# Patient Record
Sex: FEMALE | Race: WHITE | Employment: OTHER | URBAN - METROPOLITAN AREA
[De-identification: names, ages, dates, MRNs, and addresses within clinical notes are randomized per-mention and may not be internally consistent; named-entity substitution may affect disease eponyms.]

---

## 2018-08-06 ENCOUNTER — APPOINTMENT (OUTPATIENT)
Dept: ULTRASOUND IMAGING | Facility: CLINIC | Age: 73
End: 2018-08-06
Attending: PHYSICIAN ASSISTANT
Payer: COMMERCIAL

## 2018-08-06 ENCOUNTER — HOSPITAL ENCOUNTER (EMERGENCY)
Facility: CLINIC | Age: 73
Discharge: HOME | End: 2018-08-06
Attending: PHYSICIAN ASSISTANT | Admitting: PHYSICIAN ASSISTANT
Payer: COMMERCIAL

## 2018-08-06 VITALS
DIASTOLIC BLOOD PRESSURE: 82 MMHG | SYSTOLIC BLOOD PRESSURE: 150 MMHG | TEMPERATURE: 98.1 F | OXYGEN SATURATION: 98 % | RESPIRATION RATE: 16 BRPM

## 2018-08-06 DIAGNOSIS — M79.661 PAIN OF RIGHT LOWER LEG: ICD-10-CM

## 2018-08-06 PROCEDURE — G0463 HOSPITAL OUTPT CLINIC VISIT: HCPCS | Mod: 25 | Performed by: PHYSICIAN ASSISTANT

## 2018-08-06 PROCEDURE — 93971 EXTREMITY STUDY: CPT | Mod: RT

## 2018-08-06 PROCEDURE — 99213 OFFICE O/P EST LOW 20 MIN: CPT | Mod: Z6 | Performed by: PHYSICIAN ASSISTANT

## 2018-08-06 RX ORDER — NAPROXEN 375 MG/1
TABLET, DELAYED RELEASE ORAL EVERY 12 HOURS PRN
COMMUNITY
End: 2018-11-28

## 2018-08-06 RX ORDER — BUTALBITAL/ASPIRIN/CAFFEINE 50-325-40
1 CAPSULE ORAL EVERY 8 HOURS PRN
COMMUNITY
End: 2018-08-13

## 2018-08-06 RX ORDER — ONDANSETRON 4 MG/1
4 TABLET, ORALLY DISINTEGRATING ORAL EVERY 8 HOURS PRN
COMMUNITY

## 2018-08-06 RX ORDER — PROPRANOLOL HYDROCHLORIDE 40 MG/1
40 TABLET ORAL 2 TIMES DAILY
COMMUNITY
End: 2018-08-13

## 2018-08-06 RX ORDER — MEMANTINE HYDROCHLORIDE 28 MG/1
28 CAPSULE, EXTENDED RELEASE ORAL DAILY
COMMUNITY
End: 2018-08-13

## 2018-08-06 ASSESSMENT — ENCOUNTER SYMPTOMS
WHEEZING: 0
COUGH: 0
NUMBNESS: 0
JOINT SWELLING: 0
PALPITATIONS: 0
WOUND: 0
FEVER: 0
BACK PAIN: 0
WEAKNESS: 0
SHORTNESS OF BREATH: 0

## 2018-08-06 NOTE — ED AVS SNAPSHOT
[facility] Emergency Department    [address] [address]    Phone:  [phone]    Fax:  [phone]                                       Demetria France   MRN: [id number]    Department:  [facility] Emergency Department   Date of Visit:  8/6/2018           Patient Information     Date Of Birth          1945        Your diagnoses for this visit were:     Pain of right lower leg        You were seen by Brandy Candelario PA-C.      Follow-up Information     Follow up with No Ref-Primary, Physician In 1 week.        Discharge Instructions       Tylenol, heat, rest.    Patient to follow up with primary care provider for recheck in 5-7 days.     Negative ultrasound for DVT (blood clot)     Discussed with patient that she may need Physical Therapy to help with symptoms.     Patient to return to Emergency Room if fever, back pain, loss of bowel or bladder function, numbness/weakness, rash, swelling, or worsening symptoms occur    24 Hour Appointment Hotline       To make an appointment at any [facility], call [phone] ([phone]). If you don't have a family doctor or clinic, we will help you find one. [facility] clinics are conveniently located to serve the needs of you and your family.             Review of your medicines      Our records show that you are taking the medicines listed below. If these are incorrect, please call your family doctor or clinic.        Dose / Directions Last dose taken    butalbital-aspirin-caffeine -40 MG per capsule   Commonly known as:  FIORINAL   Dose:  1 capsule        Take 1 capsule by mouth every 8 hours as needed for headaches   Refills:  0        CLONAZEPAM PO   Dose:  1 mg        Take 1 mg by mouth At Bedtime   Refills:  0        LEVOTHROID PO   Dose:  100 mcg        Take 100 mcg by mouth daily   Refills:  0        memantine XR 28 MG 24 hr capsule   Commonly known as:  NAMENDA XR   Dose:  28 mg        Take 28 mg by mouth  daily   Refills:  0        METHOCARBAMOL PO   Dose:  500 mg        Take 500 mg by mouth 1-2 tabs three times daily as needed   Refills:  0        naproxen 375 MG Tbec   Generic drug:  naproxen        Take by mouth every 12 hours as needed   Refills:  0        ondansetron 4 MG ODT tab   Commonly known as:  ZOFRAN-ODT   Dose:  4 mg        Take 4 mg by mouth every 8 hours as needed for nausea   Refills:  0        PAROXETINE HCL PO   Dose:  10 mg        Take 10 mg by mouth daily   Refills:  0        propranolol 40 MG tablet   Commonly known as:  INDERAL   Dose:  40 mg        Take 40 mg by mouth 2 times daily   Refills:  0                Procedures and tests performed during your visit     US Lower Extremity Venous Duplex Right      Orders Needing Specimen Collection     None      Pending Results     Date and Time Order Name Status Description    8/6/2018 1933 US Lower Extremity Venous Duplex Right Preliminary             Pending Culture Results     No orders found from 8/4/2018 to 8/7/2018.            Pending Results Instructions     If you had any lab results that were not finalized at the time of your Discharge, you can call the ED Lab Result RN at [phone]. You will be contacted by this team for any positive Lab results or changes in treatment. The nurses are available 7 days a week from 10A to 6:30P.  You can leave a message 24 hours per day and they will return your call.        Test Results From Your Hospital Stay        8/6/2018  8:42 PM      Narrative     VENOUS DOPPLER RIGHT LOWER EXTREMITY 8/6/2018 8:15 PM    HISTORY: Right lower extremity pain.    COMPARISON: None.    FINDINGS: Color-flow imaging and Doppler waveform spectral analysis  were utilized. There is normal compressibility and spontaneous flow  throughout the right common femoral, superficial femoral, popliteal  and visualized calf veins.        Impression     IMPRESSION: No evidence for deep venous thrombosis.                 Thank you for  "choosing [facility]       Thank you for choosing [facility] for your care. Our goal is always to provide you with excellent care. Hearing back from our patients is one way we can continue to improve our services. Please take a few minutes to complete the written survey that you may receive in the mail after you visit with us. Thank you!        [employer] Information     [employer] lets you send messages to your doctor, view your test results, renew your prescriptions, schedule appointments and more. To sign up, go to [email or web address] . Click on \"Log in\" on the left side of the screen, which will take you to the Welcome page. Then click on \"Sign up Now\" on the right side of the page.     You will be asked to enter the access code listed below, as well as some personal information. Please follow the directions to create your username and password.     Your access code is: [email or web address]  Expires: 2018  8:49 PM     Your access code will  in 90 days. If you need help or a new code, please call your [facility] clinic or [phone].        Care EveryWhere ID     This is your Care EveryWhere ID. This could be used by other organizations to access your [facility] medical records  [id number]        Equal Access to Services     RICKEY JACKSON : Hadbernadette dislao Sojustin, waaxda luqadaha, qaybta kaalmada adeegyada, abigail gonzalez. So [facility] [phone].    ATENCIÓN: Si habla español, tiene a nolen disposición servicios gratuitos de asistencia lingüística. Llame al [phone].    We comply with applicable federal civil rights laws and [place] laws. We do not discriminate on the basis of race, color, national origin, age, disability, sex, sexual orientation, or gender identity.            After Visit Summary       This is your record. Keep this with you and show to your community pharmacist(s) and doctor(s) at your next visit.                  "

## 2018-08-06 NOTE — ED AVS SNAPSHOT
[facility] Emergency Department    [address] [address]    Phone:  [phone]    Fax:  [phone]                                       Demetria France   MRN: [id number]    Department:  [facility] Emergency Department   Date of Visit:  8/6/2018           After Visit Summary Signature Page     I have received my discharge instructions, and my questions have been answered. I have discussed any challenges I see with this plan with the nurse or doctor.    ..........................................................................................................................................  Patient/Patient Representative Signature      ..........................................................................................................................................  Patient Representative Print Name and Relationship to Patient    ..................................................               ................................................  Date                                            Time    ..........................................................................................................................................  Reviewed by Signature/Title    ...................................................              ..............................................  Date                                                            Time

## 2018-08-07 NOTE — ED PROVIDER NOTES
History     Chief Complaint   Patient presents with     Leg Pain     bilat lower leg pain- fall approx 1 month ago.  Continues to have pain.  States that her veins hurt     HPI  Demetria France is a 73 year old female who presents to the urgent care today with daughter who is patient's power of  for right lower leg pain.  Patient states that about 3 weeks ago she injured it, but due to dementia daughter states that mother does not know exactly how she injured leg.  Daughter also states that injury was not witnessed.  Since then patient has been complaining on and off of right lower leg pain.  Patient does have a history of varicose veins and wears BERTRAM hose daily for this.  Patient denies any significant swelling, bruising, redness, numbness/tingling, or weakness in the lower extremity.  Patient originally stated that pain was on the lateral aspect of the right calf, but after talking she then points to the posterior thigh.  Patient appears very forgetful and does not remember exactly what she was discussing throughout obtaining her history.  Daughter states that patient will call her at work crying complaining of leg pain.  Patient denies this.  Patient states that she is very concerned about a blood clot and would like this to be ruled out today, but then later on states that this is probably not a blood clot and it is just her varicose veins flaring up.  Daughter states that she did contact [place] and they informed them to come down here for further evaluation.  Patient at time of initial injury was evaluated and had an x-ray that was negative.  Daughter states she was given a prescription for anti-inflammatories and took them very irregularly.  Patient has not been taking anything for the past couple weeks.  Patient denies any history of DVTs or clots.  Patient states she does have a history of occasional lower back pain, but denies this currently.  Patient has also had sciatic flareups in the  past.    Problem List:    There are no active problems to display for this patient.       Past Medical History:    History reviewed. No pertinent past medical history.    Past Surgical History:    History reviewed. No pertinent surgical history.    Family History:    No family history on file.    Social History:  Marital Status:   [5]  Social History   Substance Use Topics     Smoking status: Never Smoker     Smokeless tobacco: Never Used     Alcohol use Not on file        Medications:      butalbital-aspirin-caffeine (FIORINAL) -40 MG per capsule   CLONAZEPAM PO   Levothyroxine Sodium (LEVOTHROID PO)   memantine XR (NAMENDA XR) 28 MG 24 hr capsule   METHOCARBAMOL PO   naproxen (NAPROXEN) 375 MG TBEC   ondansetron (ZOFRAN-ODT) 4 MG ODT tab   PAROXETINE HCL PO   propranolol (INDERAL) 40 MG tablet         Review of Systems   Constitutional: Negative for fever.   Respiratory: Negative for cough, shortness of breath and wheezing.    Cardiovascular: Negative for chest pain and palpitations.   Musculoskeletal: Negative for back pain, gait problem and joint swelling.        Right lower leg pain. Patient denies, ankle/knee/hip/groin pain.    Skin: Negative for rash and wound.   Neurological: Negative for weakness and numbness.   All other systems reviewed and are negative.      Physical Exam   BP: 150/82  Heart Rate: 50  Temp: 98.1  F (36.7  C)  Resp: 16  SpO2: 98 %      Physical Exam   Constitutional: She is oriented to person, place, and time. She appears well-developed and well-nourished. No distress.   Cardiovascular: Normal rate, regular rhythm and normal heart sounds.    Pulmonary/Chest: Effort normal and breath sounds normal.   Musculoskeletal: Normal range of motion. She exhibits no edema, tenderness or deformity.        Right hip: Normal.        Right knee: Normal.        Right ankle: Normal. Achilles tendon normal.        Right lower leg: She exhibits no tenderness, no bony tenderness, no swelling, no  edema, no deformity and no laceration.        Legs:  Negative straight leg raise bilaterally with no pain on dorsiflexion of great toes bilaterally.    Neurological: She is alert and oriented to person, place, and time. She has normal strength and normal reflexes. No cranial nerve deficit or sensory deficit. Gait normal. GCS eye subscore is 4. GCS verbal subscore is 5. GCS motor subscore is 6.   Reflex Scores:       Patellar reflexes are 2+ on the right side and 2+ on the left side.  Patient appears pleasantly confused and forgetful throughout exam.    Skin: Skin is warm, dry and intact. No abrasion, no bruising, no ecchymosis and no rash noted. She is not diaphoretic. No erythema.   Psychiatric: She has a normal mood and affect. Her speech is normal and behavior is normal. Judgment and thought content normal.   Pleasantly confused.        ED Course     ED Course     Procedures              Critical Care time:  none               Results for orders placed or performed during the hospital encounter of 08/06/18 (from the past 24 hour(s))   US Lower Extremity Venous Duplex Right    Narrative    VENOUS DOPPLER RIGHT LOWER EXTREMITY 8/6/2018 8:15 PM    HISTORY: Right lower extremity pain.    COMPARISON: None.    FINDINGS: Color-flow imaging and Doppler waveform spectral analysis  were utilized. There is normal compressibility and spontaneous flow  throughout the right common femoral, superficial femoral, popliteal  and visualized calf veins.      Impression    IMPRESSION: No evidence for deep venous thrombosis.        Medications - No data to display    Assessments & Plan (with Medical Decision Making)     I have reviewed the nursing notes.    I have reviewed the findings, diagnosis, plan and need for follow up with the patient.   discussed results with patient's daughter and patient. Negative venous doppler.     DDX: sciatic nerve pain, varicose veins, chronic leg pain.     Discussed with patient and daughter that  symptoms appear to be more sciatic in origin.     Patient to follow up with primary care provider for recheck in 1 week and may need Physical Therapy.   Heat, rest, tylenol over the counter as needed for pain.     Discharge Medication List as of 8/6/2018  8:49 PM          Final diagnoses:   Pain of right lower leg       8/6/2018   [facility] EMERGENCY DEPARTMENT     Brandy Candelario PA-C  08/06/18 [id number]

## 2018-08-07 NOTE — DISCHARGE INSTRUCTIONS
Tylenol, heat, rest.    Patient to follow up with primary care provider for recheck in 5-7 days.     Negative ultrasound for DVT (blood clot)     Discussed with patient that she may need Physical Therapy to help with symptoms.     Patient to return to Emergency Room if fever, back pain, loss of bowel or bladder function, numbness/weakness, rash, swelling, or worsening symptoms occur

## 2018-08-07 NOTE — ED TRIAGE NOTES
Patient here for leg pain - mostly the R side, symptoms started about a month ago.   Patient presents ambulaotry to the urgent care.

## 2018-08-13 ENCOUNTER — OFFICE VISIT (OUTPATIENT)
Dept: FAMILY MEDICINE | Facility: CLINIC | Age: 73
End: 2018-08-13
Payer: COMMERCIAL

## 2018-08-13 VITALS
SYSTOLIC BLOOD PRESSURE: 141 MMHG | WEIGHT: 169.6 LBS | HEART RATE: 59 BPM | DIASTOLIC BLOOD PRESSURE: 79 MMHG | TEMPERATURE: 98.3 F

## 2018-08-13 DIAGNOSIS — G47.00 PERSISTENT INSOMNIA: Primary | ICD-10-CM

## 2018-08-13 DIAGNOSIS — E03.9 ACQUIRED HYPOTHYROIDISM: ICD-10-CM

## 2018-08-13 DIAGNOSIS — M62.838 MUSCLE SPASM: ICD-10-CM

## 2018-08-13 DIAGNOSIS — G44.219 EPISODIC TENSION-TYPE HEADACHE, NOT INTRACTABLE: ICD-10-CM

## 2018-08-13 PROCEDURE — 99203 OFFICE O/P NEW LOW 30 MIN: CPT | Performed by: FAMILY MEDICINE

## 2018-08-13 RX ORDER — CLONAZEPAM 1 MG/1
0.5-1 TABLET ORAL 2 TIMES DAILY PRN
Qty: 30 TABLET | Refills: 3 | Status: SHIPPED | OUTPATIENT
Start: 2018-08-13 | End: 2018-10-02

## 2018-08-13 RX ORDER — METHOCARBAMOL 500 MG/1
TABLET, FILM COATED ORAL
Qty: 60 TABLET | Refills: 3 | Status: SHIPPED | OUTPATIENT
Start: 2018-08-13 | End: 2018-10-02

## 2018-08-13 RX ORDER — MEMANTINE HYDROCHLORIDE 28 MG/1
28 CAPSULE, EXTENDED RELEASE ORAL DAILY
Qty: 30 CAPSULE | Refills: 3 | Status: SHIPPED | OUTPATIENT
Start: 2018-08-13 | End: 2018-10-02

## 2018-08-13 RX ORDER — BUTALBITAL/ASPIRIN/CAFFEINE 50-325-40
1 CAPSULE ORAL EVERY 8 HOURS PRN
Qty: 42 CAPSULE | Refills: 3 | Status: SHIPPED | OUTPATIENT
Start: 2018-08-13 | End: 2018-10-02

## 2018-08-13 RX ORDER — LEVOTHYROXINE SODIUM 100 UG/1
100 TABLET ORAL DAILY
Qty: 90 TABLET | Refills: 3 | Status: SHIPPED | OUTPATIENT
Start: 2018-08-13

## 2018-08-13 RX ORDER — PROPRANOLOL HYDROCHLORIDE 40 MG/1
40 TABLET ORAL 2 TIMES DAILY
Qty: 60 TABLET | Refills: 3 | Status: SHIPPED | OUTPATIENT
Start: 2018-08-13 | End: 2018-10-02

## 2018-08-13 NOTE — NURSING NOTE
Chief Complaint   Patient presents with     ER F/U     Establish Care       Initial /79 (BP Location: Right arm, Patient Position: Sitting, Cuff Size: Adult Regular)  Pulse 59  Temp 98.3  F (36.8  C) (Tympanic)  Wt 169 lb 9.6 oz (76.9 kg) There is no height or weight on file to calculate BMI.      Health Maintenance that is potentially due pending provider review:  Mammogram and Colonoscopy/FIT    Patient states that she had a mammogram and colonoscopy about a year ago.Will ask for records     Is there anyone who you would like to be able to receive your results? No  If yes have patient fill out JUDIT BATES CMA

## 2018-08-13 NOTE — PROGRESS NOTES
SUBJECTIVE:   Demetria France is a 73 year old female who presents to clinic today for the following health issues:    Establish Care   This lady is new to the area and is here to talk over medications and to discuss her chronic leg pain.  This lady has moved here from [place] because she was having some difficulty managing her home because of some issues with memory.  She has been seen once in this area in the emergency room with some leg pain and at that point in time a Doppler ultrasound was normal.  She has had previous varicose vein surgery and today her current surgeon is primarily that she has some pain in her calves bilaterally and she does have some pain down the back of her thigh.  She thinks she may well have sciatica.  I have gone over all her current medications and we refilled them however I have cautioned her at length today about the fact that there are several medications here it could be playing a part in some of her mental performance issues.  She continues to live with her daughter here at [place], drive her old vehicle and admits to ongoing issues with memory loss.  ED/UC Followup:    Facility[facility]  Date of visit: 8/6/2018  Reason for visit: Pain of right lower leg  Current Status: not improved at all              Problem list and histories reviewed & adjusted, as indicated.  Additional history: as documented    There is no problem list on file for this patient.    History reviewed. No pertinent surgical history.    Social History   Substance Use Topics     Smoking status: Never Smoker     Smokeless tobacco: Never Used     Alcohol use Not on file     History reviewed. No pertinent family history.      Current Outpatient Prescriptions   Medication Sig Dispense Refill     butalbital-aspirin-caffeine (FIORINAL) -40 MG per capsule Take 1 capsule by mouth every 8 hours as needed for headaches 42 capsule 3     clonazePAM (KLONOPIN) 1 MG tablet Take 0.5-1 tablets (0.5-1 mg) by mouth 2  times daily as needed for anxiety 30 tablet 3     CLONAZEPAM PO Take 1 mg by mouth At Bedtime       levothyroxine (SYNTHROID/LEVOTHROID) 100 MCG tablet Take 1 tablet (100 mcg) by mouth daily 90 tablet 3     Levothyroxine Sodium (LEVOTHROID PO) Take 100 mcg by mouth daily       memantine XR (NAMENDA XR) 28 MG 24 hr capsule Take 1 capsule (28 mg) by mouth daily 30 capsule 3     methocarbamol (ROBAXIN) 500 MG tablet Take one at night 60 tablet 3     naproxen (NAPROSYN) 375 MG tablet Take 1 tablet (375 mg) by mouth 2 times daily (with meals) 60 tablet 3     naproxen (NAPROXEN) 375 MG TBEC Take by mouth every 12 hours as needed       PAROXETINE HCL PO Take 10 mg by mouth daily       propranolol (INDERAL) 40 MG tablet Take 1 tablet (40 mg) by mouth 2 times daily 60 tablet 3     ondansetron (ZOFRAN-ODT) 4 MG ODT tab Take 4 mg by mouth every 8 hours as needed for nausea       Allergies   Allergen Reactions     Penicillins      BP Readings from Last 3 Encounters:   08/13/18 141/79   08/06/18 150/82    Wt Readings from Last 3 Encounters:   08/13/18 169 lb 9.6 oz (76.9 kg)                    Reviewed and updated as needed this visit by clinical staff       Reviewed and updated as needed this visit by Provider         ROS:  CONSTITUTIONAL: NEGATIVE for fever, chills, change in weight  ENT/MOUTH: NEGATIVE for ear, mouth and throat problems  RESP: NEGATIVE for significant cough or SOB  CV: NEGATIVE for chest pain, palpitations or peripheral edema    OBJECTIVE:     /79 (BP Location: Right arm, Patient Position: Sitting, Cuff Size: Adult Regular)  Pulse 59  Temp 98.3  F (36.8  C) (Tympanic)  Wt 169 lb 9.6 oz (76.9 kg)  There is no height or weight on file to calculate BMI.  GENERAL: healthy, alert and no distress her physical examination today is fairly normal however she is having some definite difficulty with remembering exactly what medicine she is on and what medication it is been prescribed for.  NECK: no adenopathy,  no asymmetry, masses, or scars and thyroid normal to palpation  RESP: lungs clear to auscultation - no rales, rhonchi or wheezes  CV: regular rate and rhythm, normal S1 S2, no S3 or S4, no murmur, click or rub, no peripheral edema and peripheral pulses strong  ABDOMEN: soft, nontender, no hepatosplenomegaly, no masses and bowel sounds normal  MS: no gross musculoskeletal defects noted, no edema        ASSESSMENT/PLAN:             1. Persistent insomnia  We have cautioned her today at length about the clonazepam on a long-term basis in the person with significant memory loss.  - clonazePAM (KLONOPIN) 1 MG tablet; Take 0.5-1 tablets (0.5-1 mg) by mouth 2 times daily as needed for anxiety  Dispense: 30 tablet; Refill: 3    2. Acquired hypothyroidism  We will go ahead and arrange for her to have laboratory data is the next visit however she has had fairly recent studies done.  - levothyroxine (SYNTHROID/LEVOTHROID) 100 MCG tablet; Take 1 tablet (100 mcg) by mouth daily  Dispense: 90 tablet; Refill: 3    3. Episodic tension-type headache, not intractable  She takes Inderal for a history of migraine but it does not sound like she is having on any ongoing migraine.  - propranolol (INDERAL) 40 MG tablet; Take 1 tablet (40 mg) by mouth 2 times daily  Dispense: 60 tablet; Refill: 3  - naproxen (NAPROSYN) 375 MG tablet; Take 1 tablet (375 mg) by mouth 2 times daily (with meals)  Dispense: 60 tablet; Refill: 3    4. Muscle spasm  She uses the Fiorinal for headaches the NAMENDA for memory loss and the Robaxin for muscle cramps.  - butalbital-aspirin-caffeine (FIORINAL) -40 MG per capsule; Take 1 capsule by mouth every 8 hours as needed for headaches  Dispense: 42 capsule; Refill: 3  - memantine XR (NAMENDA XR) 28 MG 24 hr capsule; Take 1 capsule (28 mg) by mouth daily  Dispense: 30 capsule; Refill: 3  - methocarbamol (ROBAXIN) 500 MG tablet; Take one at night  Dispense: 60 tablet; Refill: 3    ASSESSMENT/PLAN: We talked  over her diagnosis at length.  I told her I am quite concerned about the array of medications that she is on since they do not seem to be doing a lot of good and they may well be doing more harm.  This point she is reluctant to stop that but I think with time will be able to convince her to go away from these meds.      ICD-10-CM    1. Persistent insomnia G47.00 clonazePAM (KLONOPIN) 1 MG tablet   2. Acquired hypothyroidism E03.9 levothyroxine (SYNTHROID/LEVOTHROID) 100 MCG tablet   3. Episodic tension-type headache, not intractable G44.219 propranolol (INDERAL) 40 MG tablet     naproxen (NAPROSYN) 375 MG tablet   4. Muscle spasm M62.838 butalbital-aspirin-caffeine (FIORINAL) -40 MG per capsule     memantine XR (NAMENDA XR) 28 MG 24 hr capsule     methocarbamol (ROBAXIN) 500 MG tablet       Patient Instructions   1.  You do have varicose veins and one small superficial clot.  No worry.    2. Same meds for now    3. Recheck two months.     4. Call if pain gets worse.          Nolan Peraza MD  [facility]

## 2018-08-13 NOTE — MR AVS SNAPSHOT
"              After Visit Summary   8/13/2018    Demetria France    MRN: [id number]           Patient Information     Date Of Birth          1945        Visit Information        Provider Department      8/13/2018 3:00 PM Nolan Peraza MD [facility]        Today's Diagnoses     Persistent insomnia    -  1    Acquired hypothyroidism        Episodic tension-type headache, not intractable        Muscle spasm          Care Instructions    1.  You do have varicose veins and one small superficial clot.  No worry.    2. Same meds for now    3. Recheck two months.     4. Call if pain gets worse.          Follow-ups after your visit        Who to contact     If you have questions or need follow up information about today's clinic visit or your schedule please contact [facility] directly at [phone].  Normal or non-critical lab and imaging results will be communicated to you by MyChart, letter or phone within 4 business days after the clinic has received the results. If you do not hear from us within 7 days, please contact the clinic through MyChart or phone. If you have a critical or abnormal lab result, we will notify you by phone as soon as possible.  Submit refill requests through [employer] or call your pharmacy and they will forward the refill request to us. Please allow 3 business days for your refill to be completed.          Additional Information About Your Visit        MyChart Information     [employer] lets you send messages to your doctor, view your test results, renew your prescriptions, schedule appointments and more. To sign up, go to [email or web address] . Click on \"Log in\" on the left side of the screen, which will take you to the Welcome page. Then click on \"Sign up Now\" on the right side of the page.     You will be asked to enter the access code listed below, as well as some personal information. Please follow the directions to create your " username and password.     Your access code is: [email or web address]  Expires: 2018  8:49 PM     Your access code will  in 90 days. If you need help or a new code, please call your [facility] clinic or [phone].        Care EveryWhere ID     This is your Care EveryWhere ID. This could be used by other organizations to access your [facility] medical records  [id number]        Your Vitals Were     Pulse Temperature                59 98.3  F (36.8  C) (Tympanic)           Blood Pressure from Last 3 Encounters:   18 141/79   18 150/82    Weight from Last 3 Encounters:   18 169 lb 9.6 oz (76.9 kg)              Today, you had the following     No orders found for display         Today's Medication Changes          These changes are accurate as of 18  3:55 PM.  If you have any questions, ask your nurse or doctor.               These medicines have changed or have updated prescriptions.        Dose/Directions    * CLONAZEPAM PO   This may have changed:  Another medication with the same name was added. Make sure you understand how and when to take each.   Changed by:  Nolan Peraza MD        Dose:  1 mg   Take 1 mg by mouth At Bedtime   Refills:  0       * clonazePAM 1 MG tablet   Commonly known as:  klonoPIN   This may have changed:  You were already taking a medication with the same name, and this prescription was added. Make sure you understand how and when to take each.   Used for:  Persistent insomnia   Changed by:  Nolan Peraza MD        Dose:  0.5-1 mg   Take 0.5-1 tablets (0.5-1 mg) by mouth 2 times daily as needed for anxiety   Quantity:  30 tablet   Refills:  3       * LEVOTHROID PO   This may have changed:  Another medication with the same name was added. Make sure you understand how and when to take each.   Changed by:  Nolan Peraza MD        Dose:  100 mcg   Take 100 mcg by mouth daily   Refills:  0       * levothyroxine 100 MCG tablet    Commonly known as:  SYNTHROID/LEVOTHROID   This may have changed:  You were already taking a medication with the same name, and this prescription was added. Make sure you understand how and when to take each.   Used for:  Acquired hypothyroidism   Changed by:  Nolan Peraza MD        Dose:  100 mcg   Take 1 tablet (100 mcg) by mouth daily   Quantity:  90 tablet   Refills:  3       methocarbamol 500 MG tablet   Commonly known as:  ROBAXIN   This may have changed:    - how much to take  - how to take this  - additional instructions   Used for:  Muscle spasm   Changed by:  Nolan Peraza MD        Take one at night   Quantity:  60 tablet   Refills:  3       * naproxen 375 MG Tbec   This may have changed:  Another medication with the same name was added. Make sure you understand how and when to take each.   Generic drug:  naproxen   Changed by:  Nolan Peraza MD        Take by mouth every 12 hours as needed   Refills:  0       * naproxen 375 MG tablet   Commonly known as:  NAPROSYN   This may have changed:  You were already taking a medication with the same name, and this prescription was added. Make sure you understand how and when to take each.   Used for:  Episodic tension-type headache, not intractable   Changed by:  Nolan Peraza MD        Dose:  375 mg   Take 1 tablet (375 mg) by mouth 2 times daily (with meals)   Quantity:  60 tablet   Refills:  3       * Notice:  This list has 6 medication(s) that are the same as other medications prescribed for you. Read the directions carefully, and ask your doctor or other care provider to review them with you.         Where to get your medicines      These medications were sent to [address] [address]     Phone:  [phone]     levothyroxine 100 MCG tablet    memantine XR 28 MG 24 hr capsule    methocarbamol 500 MG tablet     naproxen 375 MG tablet    propranolol 40 MG tablet         Some of these will need a paper prescription and others can be bought over the counter.  Ask your nurse if you have questions.     Bring a paper prescription for each of these medications     butalbital-aspirin-caffeine -40 MG per capsule    clonazePAM 1 MG tablet                Primary Care Provider Fax #    Physician No Ref-Primary [phone]       No address on file        Equal Access to Services     RICKEY JACKSON : Hadii david ku hadasho Soomaali, waaxda luqadaha, qaybta kaalmada adeegyada, waxay geronimoin hayazuln adecosta lopez konstantin . So [facility] [phone].    ATENCIÓN: Si habla nadeem, tiene a nolen disposición servicios gratuitos de asistencia lingüística. Katie al [phone].    We comply with applicable federal civil rights laws and [place] laws. We do not discriminate on the basis of race, color, national origin, age, disability, sex, sexual orientation, or gender identity.            Thank you!     Thank you for choosing [facility]  for your care. Our goal is always to provide you with excellent care. Hearing back from our patients is one way we can continue to improve our services. Please take a few minutes to complete the written survey that you may receive in the mail after your visit with us. Thank you!             Your Updated Medication List - Protect others around you: Learn how to safely use, store and throw away your medicines at www.disposemymeds.org.          This list is accurate as of 8/13/18  3:55 PM.  Always use your most recent med list.                   Brand Name Dispense Instructions for use Diagnosis    butalbital-aspirin-caffeine -40 MG per capsule    FIORINAL    42 capsule    Take 1 capsule by mouth every 8 hours as needed for headaches    Muscle spasm       * CLONAZEPAM PO      Take 1 mg by mouth At Bedtime        * clonazePAM 1 MG tablet    klonoPIN    30 tablet    Take 0.5-1 tablets (0.5-1  mg) by mouth 2 times daily as needed for anxiety    Persistent insomnia       * LEVOTHROID PO      Take 100 mcg by mouth daily        * levothyroxine 100 MCG tablet    SYNTHROID/LEVOTHROID    90 tablet    Take 1 tablet (100 mcg) by mouth daily    Acquired hypothyroidism       memantine XR 28 MG 24 hr capsule    NAMENDA XR    30 capsule    Take 1 capsule (28 mg) by mouth daily    Muscle spasm       methocarbamol 500 MG tablet    ROBAXIN    60 tablet    Take one at night    Muscle spasm       * naproxen 375 MG Tbec   Generic drug:  naproxen      Take by mouth every 12 hours as needed        * naproxen 375 MG tablet    NAPROSYN    60 tablet    Take 1 tablet (375 mg) by mouth 2 times daily (with meals)    Episodic tension-type headache, not intractable       ondansetron 4 MG ODT tab    ZOFRAN-ODT     Take 4 mg by mouth every 8 hours as needed for nausea        PAROXETINE HCL PO      Take 10 mg by mouth daily        propranolol 40 MG tablet    INDERAL    60 tablet    Take 1 tablet (40 mg) by mouth 2 times daily    Episodic tension-type headache, not intractable       * Notice:  This list has 6 medication(s) that are the same as other medications prescribed for you. Read the directions carefully, and ask your doctor or other care provider to review them with you.

## 2018-08-13 NOTE — PATIENT INSTRUCTIONS
1.  You do have varicose veins and one small superficial clot.  No worry.    2. Same meds for now    3. Recheck two months.     4. Call if pain gets worse.

## 2018-08-14 ENCOUNTER — TELEPHONE (OUTPATIENT)
Dept: FAMILY MEDICINE | Facility: CLINIC | Age: 73
End: 2018-08-14

## 2018-08-14 DIAGNOSIS — M79.661 PAIN OF RIGHT LOWER LEG: Primary | ICD-10-CM

## 2018-08-14 NOTE — TELEPHONE ENCOUNTER
Voice message was left.8/13/18 at 6:03 PM      Daughter,Aurea says her mom saw Dr Peraza Monday and she was suppose to ask for a referral for physical therapy due to her sciatica. She says the  provider told her she needed this.  Please let Aurea know if this can be done.  Her phone is [phone]

## 2018-08-14 NOTE — TELEPHONE ENCOUNTER
Verbal order per Dr Peraza to order physical therapy.  Order placed.  Daughter Shannon notified  Please sign the order for PT  Angélica Castillo RN

## 2018-08-15 ENCOUNTER — HOSPITAL ENCOUNTER (OUTPATIENT)
Dept: PHYSICAL THERAPY | Facility: CLINIC | Age: 73
Setting detail: THERAPIES SERIES
End: 2018-08-15
Attending: FAMILY MEDICINE
Payer: COMMERCIAL

## 2018-08-15 PROCEDURE — G8979 MOBILITY GOAL STATUS: HCPCS | Mod: GP,CI

## 2018-08-15 PROCEDURE — 97140 MANUAL THERAPY 1/> REGIONS: CPT | Mod: GP

## 2018-08-15 PROCEDURE — G8978 MOBILITY CURRENT STATUS: HCPCS | Mod: GP,CK

## 2018-08-15 PROCEDURE — 97535 SELF CARE MNGMENT TRAINING: CPT | Mod: GP

## 2018-08-15 PROCEDURE — 40000718 ZZHC STATISTIC PT DEPARTMENT ORTHO VISIT

## 2018-08-15 PROCEDURE — 97110 THERAPEUTIC EXERCISES: CPT | Mod: GP

## 2018-08-15 NOTE — PROGRESS NOTES
[facility]          OUTPATIENT PHYSICAL THERAPY ORTHOPEDIC EVALUATION  PLAN OF TREATMENT FOR OUTPATIENT REHABILITATION  (COMPLETE FOR INITIAL CLAIMS ONLY)  Patient's Last Name, First Name, M.I.  YOB: 1945  [place] ALEMAN    Provider s Name:  [facility]   Medical Record No.  [id number]   Start of Care Date:  08/15/18   Onset Date:  06/15/18   Type:     _X__PT   ___OT   ___SLP Medical Diagnosis:  Pain of right lower leg      PT Diagnosis:  LBP with B LE radiculopathy   Visits from SOC:  1      _________________________________________________________________________________  Plan of Treatment/Functional Goals:  ROM, strengthening, stretching, manual therapy (Posture and body mechanics ed)           Goals  Goal Identifier: 1  Goal Description: Pt will be able to amb with < 3/10 pain.  Target Date: 09/12/18    Goal Identifier: 2  Goal Description: Pt will be able to get in/out of car with < 3/10 pain.  Target Date: 09/26/18    Goal Identifier: 3  Goal Description: Pt will be able to get out of bed with < 3/10 pain.  Target Date: 10/10/18    Goal Identifier: 4  Goal Description: Pt will be independent with HEP for optimal functional recovery.  Target Date: 10/10/18                                                Therapy Frequency:  1 time/week  Predicted Duration of Therapy Intervention:  8 weeks    LUIZ SMITH PT                 I CERTIFY THE NEED FOR THESE SERVICES FURNISHED UNDER        THIS PLAN OF TREATMENT AND WHILE UNDER MY CARE     (Physician co-signature of this document indicates review and certification of the therapy plan).                       Certification Date From:  08/15/18   Certification Date To:  11/12/18    Referring Provider:  Dr Peraza    Initial Assessment        See Epic Evaluation Start of Care Date: 08/15/18

## 2018-08-15 NOTE — PROGRESS NOTES
PT Lumbar Evaluation 08/15/18 1400   General Information   Type of Visit Initial OP Ortho PT Evaluation   Start of Care Date 08/15/18   Referring Physician Dr Peraza   Patient/Family Goals Statement get rid of pain and be able to walk without compression stockings   Orders Evaluate and Treat   Date of Order 08/13/18   Insurance Type Medicare;Private  ([employer])   Medical Diagnosis Pain of right lower leg    Surgical/Medical history reviewed Yes  (fibromyalgia, arthritis, back surgeries)   Body Part(s)   Body Part(s) Lumbar Spine/SI   Presentation and Etiology   Pertinent history of current problem (include personal factors and/or comorbidities that impact the POC) Pt was going down concrete steps and twisted her R ankle 2 months ago. The pain was mostly in the R calf and went up the back of her leg to her LB. 2 weeks ago, she twisted her L ankle the same way. Then a few days later her L calf, post thigh and LB hurt. The LBP and thigh pain is better than initially--a tolerable level. But she still has significant calf pain B. She has had LBP before, but never had pain in the legs.   Impairments A. Pain;B. Decreased WB tolerance;C. Swelling;D. Decreased ROM;E. Decreased flexibility;F. Decreased strength and endurance;G. Impaired balance;H. Impaired gait   Functional Limitations perform activities of daily living;perform desired leisure / sports activities  (walking, stairs, standing)   Symptom Location top and bottom of foot and calves and LB, sides of thighs   Onset date of current episode/exacerbation 06/15/18   Chronicity New   Pain rating (0-10 point scale) Best (/10);Worst (/10)  (currently 5/10)   Best (/10) 5   Worst (/10) 10  (first thing in am-occasionally, a lot of standing/walking)   Pain quality C. Aching;D. Burning;G. Cramping;H. Other  (throbbing)   Frequency of pain/symptoms A. Constant   Pain/symptoms are: Worse in the morning   Pain/symptoms exacerbated by B. Walking;C. Lifting;D. Carrying;J. ADL    Pain/symptoms eased by C. Rest;J. Braces/supports   Progression of symptoms since onset: Improved   Prior Level of Function   Functional Level Prior Comment independent   Current Level of Function   Current Community Support Family/friend caregiver   Patient role/employment history F. Retired   Living environment House/townhome   Home/community accessibility stairs   Current equipment-Gait/Locomotion None   Fall Risk Screen   Fall screen completed by PT   Have you fallen 2 or more times in the past year? No   Have you fallen and had an injury in the past year? No   Is patient a fall risk? No   Functional Scales   Functional Scales Other   Other Scales  LEFS 30/80   Lumbar Spine/SI Objective Findings   Posture fwd head, rounded shlds, kyphosis   Gait/Locomotion Pt amb with antalgic gait, fwd flexed posture.   Flexion ROM fingers to toes with + LBP   Extension ROM severely limited with +++LBP and B post thigh and calf pain   Right Side Bending ROM mod limited with ++ R calf pain   Left Side Bending ROM WFL with + L lat thigh pain   Lumbar ROM Comment B rot WFL    Pelvic Screen - Standing fwd bend, - [place], - compression/distraction   Hip Screen R hip flex incr R calf pain, L TOR incr R post thigh pain   Transversus Abdominus Strength (Gutierrez Leg Lowering-deg) unable to perform a plank due to incr pain   Hip Flexion (L2) Strength 4/5   Hip Abduction Strength 4/5   Hip Adduction Strength 5/5   Hip Extension Strength 4/5   Knee Flexion Strength 5/5   Knee Extension (L3) Strength 5/5   Ankle Dorsiflexion (L4) Strength 5/5   Great Toe Extension (L5) Strength 5/5   Ankle Plantar Flexion (S1) Strength 5/5 ++   Hip Flexor Flexibility tight R>L   Obers Flexibility normal   Piriformis Flexibility tight   SLR +R at 40*, +L at 45*   Nolan Test + LBP   Repeated Extension Prone SEVERO incr LBP and B LE pain   Slump Test + B   Spring Test pain with PA glides to L4-5   Palpation very tender L4-5 paraspinals with radiation down B  LE, pressure on B QL radiated down B LE, tender B piriformis, psoas, IT bands, L post proximal calf, R lat calf, tops of feet, R arch   Planned Therapy Interventions   Planned Therapy Interventions ROM;strengthening;stretching;manual therapy  (Posture and body mechanics ed)   Clinical Impression   Criteria for Skilled Therapeutic Interventions Met yes, treatment indicated   PT Diagnosis LBP with B LE radiculopathy   Influenced by the following impairments pain, weakness, poor posture and body mechanics   Functional limitations due to impairments walking, standing, stairs, transfers   Clinical Presentation Stable/Uncomplicated   Clinical Presentation Rationale clinical judgement   Clinical Decision Making (Complexity) Low complexity   Therapy Frequency 1 time/week   Predicted Duration of Therapy Intervention (days/wks) 8 weeks   Risk & Benefits of therapy have been explained Yes   Patient, Family & other staff in agreement with plan of care Yes   Education Assessment   Preferred Learning Style Listening;Demonstration;Pictures/video   Barriers to Learning No barriers   ORTHO GOALS   PT Ortho Eval Goals 1;2;3;4   Ortho Goal 1   Goal Identifier 1   Goal Description Pt will be able to amb with < 3/10 pain.   Target Date 09/12/18   Ortho Goal 2   Goal Identifier 2   Goal Description Pt will be able to get in/out of car with < 3/10 pain.   Target Date 09/26/18   Ortho Goal 3   Goal Identifier 3   Goal Description Pt will be able to get out of bed with < 3/10 pain.   Target Date 10/10/18   Ortho Goal 4   Goal Identifier 4   Goal Description Pt will be independent with HEP for optimal functional recovery.   Target Date 10/10/18   Total Evaluation Time   Total Evaluation Time 30   Therapy Certification   Certification date from 08/15/18   Certification date to 11/12/18   Medical Diagnosis Pain of right lower leg      Nel Echevarria PT

## 2018-08-23 ENCOUNTER — TELEPHONE (OUTPATIENT)
Dept: FAMILY MEDICINE | Facility: CLINIC | Age: 73
End: 2018-08-23

## 2018-08-23 DIAGNOSIS — R41.3 MEMORY LOSS: Primary | ICD-10-CM

## 2018-08-23 NOTE — TELEPHONE ENCOUNTER
Patient's daughter Aurea is calling stating Demetria's alzheimer's seems to be getting worse and she is wondering if Demetria could get a referral to a neurologist that specializes in this. Please advise.    Janette Juarez-Station

## 2018-08-23 NOTE — TELEPHONE ENCOUNTER
Daughter Aurea notified and given number to schedule with [facility].  Destini MCCRAY RN

## 2018-10-02 ENCOUNTER — TELEPHONE (OUTPATIENT)
Dept: FAMILY MEDICINE | Facility: CLINIC | Age: 73
End: 2018-10-02

## 2018-10-02 ENCOUNTER — OFFICE VISIT (OUTPATIENT)
Dept: FAMILY MEDICINE | Facility: CLINIC | Age: 73
End: 2018-10-02
Payer: COMMERCIAL

## 2018-10-02 VITALS
HEART RATE: 52 BPM | OXYGEN SATURATION: 94 % | DIASTOLIC BLOOD PRESSURE: 70 MMHG | SYSTOLIC BLOOD PRESSURE: 138 MMHG | WEIGHT: 169 LBS

## 2018-10-02 DIAGNOSIS — R41.3 MEMORY LOSS: Primary | ICD-10-CM

## 2018-10-02 DIAGNOSIS — G47.00 PERSISTENT INSOMNIA: ICD-10-CM

## 2018-10-02 DIAGNOSIS — M62.838 MUSCLE SPASM: ICD-10-CM

## 2018-10-02 DIAGNOSIS — G44.219 EPISODIC TENSION-TYPE HEADACHE, NOT INTRACTABLE: ICD-10-CM

## 2018-10-02 PROBLEM — E03.9 ACQUIRED HYPOTHYROIDISM: Status: ACTIVE | Noted: 2018-10-02

## 2018-10-02 PROCEDURE — 99214 OFFICE O/P EST MOD 30 MIN: CPT | Performed by: FAMILY MEDICINE

## 2018-10-02 RX ORDER — BUTALBITAL/ASPIRIN/CAFFEINE 50-325-40
1 CAPSULE ORAL EVERY 8 HOURS PRN
Qty: 42 CAPSULE | Refills: 3 | Status: SHIPPED | OUTPATIENT
Start: 2018-10-02

## 2018-10-02 RX ORDER — PROPRANOLOL HYDROCHLORIDE 40 MG/1
40 TABLET ORAL 2 TIMES DAILY
Qty: 60 TABLET | Refills: 3 | Status: SHIPPED | OUTPATIENT
Start: 2018-10-02 | End: 2018-10-29

## 2018-10-02 RX ORDER — METHOCARBAMOL 500 MG/1
TABLET, FILM COATED ORAL
Qty: 60 TABLET | Refills: 3 | Status: SHIPPED | OUTPATIENT
Start: 2018-10-02

## 2018-10-02 RX ORDER — DONEPEZIL HYDROCHLORIDE 5 MG/1
5 TABLET, FILM COATED ORAL AT BEDTIME
Qty: 60 TABLET | Refills: 3 | Status: SHIPPED | OUTPATIENT
Start: 2018-10-02 | End: 2018-11-28

## 2018-10-02 RX ORDER — MEMANTINE HYDROCHLORIDE 28 MG/1
28 CAPSULE, EXTENDED RELEASE ORAL DAILY
Qty: 30 CAPSULE | Refills: 3 | Status: SHIPPED | OUTPATIENT
Start: 2018-10-02 | End: 2019-01-02

## 2018-10-02 RX ORDER — CLONAZEPAM 1 MG/1
0.5-1 TABLET ORAL 2 TIMES DAILY PRN
Qty: 30 TABLET | Refills: 3 | Status: SHIPPED | OUTPATIENT
Start: 2018-10-02 | End: 2018-12-16

## 2018-10-02 NOTE — MR AVS SNAPSHOT
After Visit Summary   10/2/2018    Demetria France    MRN: [id number]           Patient Information     Date Of Birth          1945        Visit Information        Provider Department      10/2/2018 1:00 PM Nolan Peraza MD [facility]        Today's Diagnoses     Memory loss    -  1    Muscle spasm        Episodic tension-type headache, not intractable        Persistent insomnia          Care Instructions    1. Lets get the neurologist to review the memory lose.    2. Same meds for now    3. We will work on old records.               Follow-ups after your visit        Additional Services     NEUROLOGY ADULT REFERRAL       Your provider has referred you for the following:   Consult at [facility]: [facility] [phone]   [email or web address]    Please be aware that coverage of these services is subject to the terms and limitations of your health insurance plan.  Call member services at your health plan with any benefit or coverage questions.      Please bring the following with you to your appointment:    (1) Any X-Rays, CTs or MRIs which have been performed.  Contact the facility where they were done to arrange for  prior to your scheduled appointment.    (2) List of current medications  (3) This referral request   (4) Any documents/labs given to you for this referral                  Who to contact     If you have questions or need follow up information about today's clinic visit or your schedule please contact [facility] directly at [phone].  Normal or non-critical lab and imaging results will be communicated to you by MyChart, letter or phone within 4 business days after the clinic has received the results. If you do not hear from us within 7 days, please contact the clinic through MyChart or phone. If you have a critical or abnormal lab result, we will notify you by phone as soon as  possible.  Submit refill requests through [employer] or call your pharmacy and they will forward the refill request to us. Please allow 3 business days for your refill to be completed.          Additional Information About Your Visit        Care EveryWhere ID     This is your Care EveryWhere ID. This could be used by other organizations to access your [place] medical records  [id number]        Your Vitals Were     Pulse Pulse Oximetry                52 94%           Blood Pressure from Last 3 Encounters:   10/02/18 138/70   08/13/18 141/79   08/06/18 150/82    Weight from Last 3 Encounters:   10/02/18 169 lb (76.7 kg)   08/13/18 169 lb 9.6 oz (76.9 kg)              We Performed the Following     NEUROLOGY ADULT REFERRAL          Today's Medication Changes          These changes are accurate as of 10/2/18  1:32 PM.  If you have any questions, ask your nurse or doctor.               Start taking these medicines.        Dose/Directions    donepezil 5 MG tablet   Commonly known as:  ARIcept   Used for:  Memory loss        Dose:  5 mg   Take 1 tablet (5 mg) by mouth At Bedtime   Quantity:  60 tablet   Refills:  3            Where to get your medicines      These medications were sent to [facility] [address] [address]     Phone:  [phone]     donepezil 5 MG tablet    memantine XR 28 MG 24 hr capsule    methocarbamol 500 MG tablet    naproxen 375 MG tablet    propranolol 40 MG tablet         Some of these will need a paper prescription and others can be bought over the counter.  Ask your nurse if you have questions.     Bring a paper prescription for each of these medications     butalbital-aspirin-caffeine -40 MG per capsule    clonazePAM 1 MG tablet                Primary Care Provider Fax #    Physician No Ref-Primary [phone]       No address on file        Equal Access to Services     RICKEY JACKSON AH: Edith smith  Justus, wamelanieda luqadaha, qaybta kaalmada donnell, abigail landa madiebrent lajennyisrael lisa. So [facility] [phone].    ATENCIÓN: Si jeff silver, tiene a nolen disposición servicios gratuitos de asistencia lingüística. Katie al [phone].    We comply with applicable federal civil rights laws and [place] laws. We do not discriminate on the basis of race, color, national origin, age, disability, sex, sexual orientation, or gender identity.            Thank you!     Thank you for choosing [facility]  for your care. Our goal is always to provide you with excellent care. Hearing back from our patients is one way we can continue to improve our services. Please take a few minutes to complete the written survey that you may receive in the mail after your visit with us. Thank you!             Your Updated Medication List - Protect others around you: Learn how to safely use, store and throw away your medicines at www.disposemymeds.org.          This list is accurate as of 10/2/18  1:32 PM.  Always use your most recent med list.                   Brand Name Dispense Instructions for use Diagnosis    butalbital-aspirin-caffeine -40 MG per capsule    FIORINAL    42 capsule    Take 1 capsule by mouth every 8 hours as needed for headaches    Muscle spasm       * CLONAZEPAM PO      Take 1 mg by mouth At Bedtime        * clonazePAM 1 MG tablet    klonoPIN    30 tablet    Take 0.5-1 tablets (0.5-1 mg) by mouth 2 times daily as needed for anxiety    Persistent insomnia       donepezil 5 MG tablet    ARIcept    60 tablet    Take 1 tablet (5 mg) by mouth At Bedtime    Memory loss       * LEVOTHROID PO      Take 100 mcg by mouth daily        * levothyroxine 100 MCG tablet    SYNTHROID/LEVOTHROID    90 tablet    Take 1 tablet (100 mcg) by mouth daily    Acquired hypothyroidism       memantine XR 28 MG 24 hr capsule    NAMENDA XR    30 capsule    Take 1 capsule (28 mg) by mouth daily    Muscle spasm        methocarbamol 500 MG tablet    ROBAXIN    60 tablet    Take one at night    Muscle spasm       * naproxen 375 MG Tbec   Generic drug:  naproxen      Take by mouth every 12 hours as needed        * naproxen 375 MG tablet    NAPROSYN    60 tablet    Take 1 tablet (375 mg) by mouth 2 times daily (with meals)    Episodic tension-type headache, not intractable       ondansetron 4 MG ODT tab    ZOFRAN-ODT     Take 4 mg by mouth every 8 hours as needed for nausea        PAROXETINE HCL PO      Take 10 mg by mouth daily        propranolol 40 MG tablet    INDERAL    60 tablet    Take 1 tablet (40 mg) by mouth 2 times daily    Episodic tension-type headache, not intractable       * Notice:  This list has 6 medication(s) that are the same as other medications prescribed for you. Read the directions carefully, and ask your doctor or other care provider to review them with you.

## 2018-10-02 NOTE — PROGRESS NOTES
SUBJECTIVE:   Demetria France is a 73 year old female who presents to clinic today for the following health issues:    This lady with memory lose returns for medication refills.  No change on meds.   Did see neurology in [place] and they felt she had early memory lose.  Family history of the same.  Still driving   Patient is here for follow up from 8/13/18. States she is needing prescription refills          Problem list and histories reviewed & adjusted, as indicated.  Additional history: as documented    Patient Active Problem List   Diagnosis     Acquired hypothyroidism     No past surgical history on file.    Social History   Substance Use Topics     Smoking status: Never Smoker     Smokeless tobacco: Never Used     Alcohol use Not on file     No family history on file.      Current Outpatient Prescriptions   Medication Sig Dispense Refill     butalbital-aspirin-caffeine (FIORINAL) -40 MG per capsule Take 1 capsule by mouth every 8 hours as needed for headaches 42 capsule 3     clonazePAM (KLONOPIN) 1 MG tablet Take 0.5-1 tablets (0.5-1 mg) by mouth 2 times daily as needed for anxiety 30 tablet 3     CLONAZEPAM PO Take 1 mg by mouth At Bedtime       donepezil (ARICEPT) 5 MG tablet Take 1 tablet (5 mg) by mouth At Bedtime 60 tablet 3     levothyroxine (SYNTHROID/LEVOTHROID) 100 MCG tablet Take 1 tablet (100 mcg) by mouth daily 90 tablet 3     Levothyroxine Sodium (LEVOTHROID PO) Take 100 mcg by mouth daily       memantine XR (NAMENDA XR) 28 MG 24 hr capsule Take 1 capsule (28 mg) by mouth daily 30 capsule 3     methocarbamol (ROBAXIN) 500 MG tablet Take one at night 60 tablet 3     naproxen (NAPROSYN) 375 MG tablet Take 1 tablet (375 mg) by mouth 2 times daily (with meals) 60 tablet 3     naproxen (NAPROXEN) 375 MG TBEC Take by mouth every 12 hours as needed       PAROXETINE HCL PO Take 10 mg by mouth daily       propranolol (INDERAL) 40 MG tablet Take 1 tablet (40 mg) by mouth 2 times daily 60 tablet 3      ondansetron (ZOFRAN-ODT) 4 MG ODT tab Take 4 mg by mouth every 8 hours as needed for nausea       [DISCONTINUED] clonazePAM (KLONOPIN) 1 MG tablet Take 0.5-1 tablets (0.5-1 mg) by mouth 2 times daily as needed for anxiety 30 tablet 3     [DISCONTINUED] propranolol (INDERAL) 40 MG tablet Take 1 tablet (40 mg) by mouth 2 times daily 60 tablet 3       Reviewed and updated as needed this visit by clinical staff       Reviewed and updated as needed this visit by Provider         ROS:  CONSTITUTIONAL: NEGATIVE for fever, chills, change in weight  ENT/MOUTH: NEGATIVE for ear, mouth and throat problems  RESP: NEGATIVE for significant cough or SOB  CV: NEGATIVE for chest pain, palpitations or peripheral edema    OBJECTIVE:     /70 (BP Location: Right arm, Patient Position: Chair, Cuff Size: Adult Regular)  Pulse 52  Wt 169 lb (76.7 kg)  SpO2 94%  There is no height or weight on file to calculate BMI.  GENERAL: healthy, alert and no distress  NECK: no adenopathy, no asymmetry, masses, or scars and thyroid normal to palpation  RESP: lungs clear to auscultation - no rales, rhonchi or wheezes  CV: regular rate and rhythm, normal S1 S2, no S3 or S4, no murmur, click or rub, no peripheral edema and peripheral pulses strong  ABDOMEN: soft, nontender, no hepatosplenomegaly, no masses and bowel sounds normal  MS: no gross musculoskeletal defects noted, no edema  NEURO: Normal strength and tone, mentation intact and speech normal        ASSESSMENT/PLAN:             1. Muscle spasm  Stable   - butalbital-aspirin-caffeine (FIORINAL) -40 MG per capsule; Take 1 capsule by mouth every 8 hours as needed for headaches  Dispense: 42 capsule; Refill: 3  - methocarbamol (ROBAXIN) 500 MG tablet; Take one at night  Dispense: 60 tablet; Refill: 3  - memantine XR (NAMENDA XR) 28 MG 24 hr capsule; Take 1 capsule (28 mg) by mouth daily  Dispense: 30 capsule; Refill: 3    2. Episodic tension-type headache, not intractable  Good  results   - propranolol (INDERAL) 40 MG tablet; Take 1 tablet (40 mg) by mouth 2 times daily  Dispense: 60 tablet; Refill: 3  - naproxen (NAPROSYN) 375 MG tablet; Take 1 tablet (375 mg) by mouth 2 times daily (with meals)  Dispense: 60 tablet; Refill: 3    3. Persistent insomnia    - clonazePAM (KLONOPIN) 1 MG tablet; Take 0.5-1 tablets (0.5-1 mg) by mouth 2 times daily as needed for anxiety  Dispense: 30 tablet; Refill: 3    4. Memory loss  Schedule with neurology   - donepezil (ARICEPT) 5 MG tablet; Take 1 tablet (5 mg) by mouth At Bedtime  Dispense: 60 tablet; Refill: 3  - NEUROLOGY ADULT REFERRAL    ASSESSMENT/PLAN:      ICD-10-CM    1. Memory loss R41.3 donepezil (ARICEPT) 5 MG tablet     NEUROLOGY ADULT REFERRAL   2. Muscle spasm M62.838 butalbital-aspirin-caffeine (FIORINAL) -40 MG per capsule     methocarbamol (ROBAXIN) 500 MG tablet     memantine XR (NAMENDA XR) 28 MG 24 hr capsule   3. Episodic tension-type headache, not intractable G44.219 propranolol (INDERAL) 40 MG tablet     naproxen (NAPROSYN) 375 MG tablet   4. Persistent insomnia G47.00 clonazePAM (KLONOPIN) 1 MG tablet       Patient Instructions   1. Lets get the neurologist to review the memory lose.    2. Same meds for now    3. We will work on old records.               Nolan Peraza MD  [facility]

## 2018-10-02 NOTE — TELEPHONE ENCOUNTER
Methocarbamol 500 mg is currently unavailable through the , would you be willing to either switch medication or temporarily write a new rx for the 750 mg tabs. Please speak to pharmacy directly with any questions or concerns.    Thank you,  Maura Adhikari Technician   [facility]  On behalf of  [facility]

## 2018-10-02 NOTE — PATIENT INSTRUCTIONS
1. Lets get the neurologist to review the memory lose.    2. Same meds for now    3. We will work on old records.

## 2018-10-03 NOTE — TELEPHONE ENCOUNTER
She is taking this drug for restless leg syndrome and I told her during her visit here this is probably not the best medication for the same.  I would suggest that she tries to go without it for some time and then will decide if that does not work then we will add a different medication for her restless legs.  She may not be completely happy with this but I think would be the right thing to do.Nolan Peraza

## 2018-10-15 ENCOUNTER — TELEPHONE (OUTPATIENT)
Dept: FAMILY MEDICINE | Facility: CLINIC | Age: 73
End: 2018-10-15

## 2018-10-15 RX ORDER — ONDANSETRON 4 MG/1
4 TABLET, ORALLY DISINTEGRATING ORAL EVERY 8 HOURS PRN
Qty: 120 TABLET | Status: CANCELLED | OUTPATIENT
Start: 2018-10-15

## 2018-10-15 NOTE — TELEPHONE ENCOUNTER
I am concerned about how Demetria is taking her medications.  She should be taking 1 daily of her Namenda XR 28 mg and last month she reported losing #8 of them and had to pay out of pocket for them, now this month she is reporting she is already out of her Namenda again (has picked up #30 since 9/21/18)  She also reports being shorted on her levothyroxine, we dispensed a 3 month supply on 8/9/18 and she says she is out.  Can someone from the clinic follow up with Demetria?    Thanks,    Antoine Edmond, Pharm D  [facility]  [phone]

## 2018-10-15 NOTE — TELEPHONE ENCOUNTER
Patient is calling to request a refill of her Zofran. She takes it every 8 hours as needed for nausea. Only got 10 the last time.     Janette Juarez-Station

## 2018-10-29 ENCOUNTER — OFFICE VISIT (OUTPATIENT)
Dept: FAMILY MEDICINE | Facility: CLINIC | Age: 73
End: 2018-10-29
Payer: COMMERCIAL

## 2018-10-29 VITALS
SYSTOLIC BLOOD PRESSURE: 138 MMHG | HEART RATE: 57 BPM | DIASTOLIC BLOOD PRESSURE: 92 MMHG | RESPIRATION RATE: 16 BRPM | OXYGEN SATURATION: 98 % | HEIGHT: 66 IN | WEIGHT: 166 LBS | TEMPERATURE: 98.5 F | BODY MASS INDEX: 26.68 KG/M2

## 2018-10-29 DIAGNOSIS — F05 SUNDOWN SYNDROME: ICD-10-CM

## 2018-10-29 DIAGNOSIS — E89.0 POSTOPERATIVE HYPOTHYROIDISM: ICD-10-CM

## 2018-10-29 DIAGNOSIS — F32.A DEPRESSION, UNSPECIFIED DEPRESSION TYPE: ICD-10-CM

## 2018-10-29 DIAGNOSIS — G30.9 ALZHEIMER'S DEMENTIA WITH BEHAVIORAL DISTURBANCE, UNSPECIFIED TIMING OF DEMENTIA ONSET: Primary | ICD-10-CM

## 2018-10-29 DIAGNOSIS — G44.219 EPISODIC TENSION-TYPE HEADACHE, NOT INTRACTABLE: ICD-10-CM

## 2018-10-29 DIAGNOSIS — F02.81 ALZHEIMER'S DEMENTIA WITH BEHAVIORAL DISTURBANCE, UNSPECIFIED TIMING OF DEMENTIA ONSET: Primary | ICD-10-CM

## 2018-10-29 LAB
ERYTHROCYTE [DISTWIDTH] IN BLOOD BY AUTOMATED COUNT: 14 % (ref 10–15)
HCT VFR BLD AUTO: 43.1 % (ref 35–47)
HGB BLD-MCNC: 13.8 G/DL (ref 11.7–15.7)
MCH RBC QN AUTO: 31 PG (ref 26.5–33)
MCHC RBC AUTO-ENTMCNC: 32 G/DL (ref 31.5–36.5)
MCV RBC AUTO: 97 FL (ref 78–100)
PLATELET # BLD AUTO: 126 10E9/L (ref 150–450)
RBC # BLD AUTO: 4.45 10E12/L (ref 3.8–5.2)
T4 FREE SERPL-MCNC: 1.18 NG/DL (ref 0.76–1.46)
TSH SERPL DL<=0.005 MIU/L-ACNC: 0.33 MU/L (ref 0.4–4)
WBC # BLD AUTO: 7.2 10E9/L (ref 4–11)

## 2018-10-29 PROCEDURE — 84443 ASSAY THYROID STIM HORMONE: CPT | Performed by: NURSE PRACTITIONER

## 2018-10-29 PROCEDURE — 36415 COLL VENOUS BLD VENIPUNCTURE: CPT | Performed by: NURSE PRACTITIONER

## 2018-10-29 PROCEDURE — 85027 COMPLETE CBC AUTOMATED: CPT | Performed by: NURSE PRACTITIONER

## 2018-10-29 PROCEDURE — 84439 ASSAY OF FREE THYROXINE: CPT | Performed by: NURSE PRACTITIONER

## 2018-10-29 PROCEDURE — 99214 OFFICE O/P EST MOD 30 MIN: CPT | Performed by: NURSE PRACTITIONER

## 2018-10-29 RX ORDER — CITALOPRAM HYDROBROMIDE 10 MG/1
10 TABLET ORAL DAILY
Qty: 90 TABLET | Refills: 3 | Status: SHIPPED | OUTPATIENT
Start: 2018-10-29

## 2018-10-29 RX ORDER — PROPRANOLOL HYDROCHLORIDE 40 MG/1
40 TABLET ORAL 2 TIMES DAILY
Qty: 60 TABLET | Refills: 3 | Status: SHIPPED | OUTPATIENT
Start: 2018-10-29 | End: 2019-01-18

## 2018-10-29 RX ORDER — MIRTAZAPINE 7.5 MG/1
7.5 TABLET, FILM COATED ORAL AT BEDTIME
Qty: 30 TABLET | Refills: 1 | Status: SHIPPED | OUTPATIENT
Start: 2018-10-29 | End: 2018-12-18

## 2018-10-29 NOTE — LETTER
October 29, 2018      Demetria France  [address] [id number]        Dear ,    We are writing to inform you of your test results. Lab shows mildly low platelets.  I am not concerned with this level with your history of low platelets.  Recommend follow up discussion with PCP (primary care provider) of choice on how often to check lab.    Resulted Orders   CBC with platelets   Result Value Ref Range    WBC 7.2 4.0 - 11.0 10e9/L    RBC Count 4.45 3.8 - 5.2 10e12/L    Hemoglobin 13.8 11.7 - 15.7 g/dL    Hematocrit 43.1 35.0 - 47.0 %    MCV 97 78 - 100 fl    MCH 31.0 26.5 - 33.0 pg    MCHC 32.0 31.5 - 36.5 g/dL    RDW 14.0 10.0 - 15.0 %    Platelet Count 126 (L) 150 - 450 10e9/L       If you have any questions or concerns, please call the clinic at the number listed above.       Sincerely,        Bernadette Duarte NP

## 2018-10-29 NOTE — PROGRESS NOTES
SUBJECTIVE:   Demetria France is a 73 year old female who presents to clinic today for the following health issues:    Chief Complaint   Patient presents with     Behavioral changes     Daughter (Aurea) states that her mother has been diagnosed with Alzheimers while she was in [place]. Daughter states that she has been having increased forgetfulness, false memories, depression, sadness and increased stubborness. The patient believes that there is nothing wrong and thinks that her changes are because she moved from her home and her age.       Blood Draw     Daughter wants to have blood checked. She has a hx of low iron and platelets.     Patient is new to the area and moved from [place] due to increased symptoms of her Alzheimer's to live with her daughter for care.  At this time, patient doesn't feel that there is anything wrong except that she is down because she doesn't live in a city and there is nothing to do and she wants to go back to living on her own and not being a burden to her daughter and her family.  During the appointment patient would say she was not taking medications but her daughter stated she did.  She has been having increase in symptoms and her daughter is clearly worried about her.  They have an appointment with neurology at the end of December.  Daughter is also concerned because she has had a hx of low platelets or iron and is not sure if this could contribute to changes or if her thyroid is off and would like labs.    Problem list and histories reviewed & adjusted, as indicated.  Additional history: as documented    Patient Active Problem List   Diagnosis     Acquired hypothyroidism     History reviewed. No pertinent surgical history.    Social History   Substance Use Topics     Smoking status: Never Smoker     Smokeless tobacco: Never Used     Alcohol use Not on file     History reviewed. No pertinent family history.      Current Outpatient Prescriptions   Medication Sig Dispense  Refill     Apoaequorin (PREVAGEN PO)        butalbital-aspirin-caffeine (FIORINAL) -40 MG per capsule Take 1 capsule by mouth every 8 hours as needed for headaches 42 capsule 3     citalopram (CELEXA) 10 MG tablet Take 1 tablet (10 mg) by mouth daily 90 tablet 3     clonazePAM (KLONOPIN) 1 MG tablet Take 0.5-1 tablets (0.5-1 mg) by mouth 2 times daily as needed for anxiety 30 tablet 3     CLONAZEPAM PO Take 1 mg by mouth At Bedtime       donepezil (ARICEPT) 5 MG tablet Take 1 tablet (5 mg) by mouth At Bedtime 60 tablet 3     levothyroxine (SYNTHROID/LEVOTHROID) 100 MCG tablet Take 1 tablet (100 mcg) by mouth daily 90 tablet 3     Levothyroxine Sodium (LEVOTHROID PO) Take 100 mcg by mouth daily       memantine XR (NAMENDA XR) 28 MG 24 hr capsule Take 1 capsule (28 mg) by mouth daily 30 capsule 3     methocarbamol (ROBAXIN) 500 MG tablet Take one at night 60 tablet 3     mirtazapine (REMERON) 7.5 MG TABS tablet Take 1 tablet (7.5 mg) by mouth At Bedtime 30 tablet 1     naproxen (NAPROSYN) 375 MG tablet Take 1 tablet (375 mg) by mouth 2 times daily (with meals) 60 tablet 3     naproxen (NAPROXEN) 375 MG TBEC Take by mouth every 12 hours as needed       ondansetron (ZOFRAN-ODT) 4 MG ODT tab Take 4 mg by mouth every 8 hours as needed for nausea       propranolol (INDERAL) 40 MG tablet Take 1 tablet (40 mg) by mouth 2 times daily 60 tablet 3     [DISCONTINUED] propranolol (INDERAL) 40 MG tablet Take 1 tablet (40 mg) by mouth 2 times daily 60 tablet 3     Allergies   Allergen Reactions     Penicillins        Reviewed and updated as needed this visit by clinical staff  Tobacco  Allergies  Meds  Problems  Med Hx  Surg Hx  Fam Hx  Soc Hx        Reviewed and updated as needed this visit by Provider  Allergies  Meds  Problems         ROS:  CONSTITUTIONAL: NEGATIVE for fever, chills, change in weight  RESP: NEGATIVE for significant cough or SOB  CV: NEGATIVE for chest pain, palpitations or peripheral  "edema  PSYCHIATRIC: depressed mood, feelings of worthlessness/guilt and impaired memory  ROS otherwise negative    OBJECTIVE:     BP (!) 138/92  Pulse 57  Temp 98.5  F (36.9  C) (Tympanic)  Resp 16  Ht 5' 5.75\" (1.67 m)  Wt 166 lb (75.3 kg)  SpO2 98%  BMI 27 kg/m2  Body mass index is 27 kg/(m^2).  GENERAL: healthy, alert and no distress  RESP: lungs clear to auscultation - no rales, rhonchi or wheezes  CV: regular rate and rhythm, normal S1 S2, no S3 or S4, no murmur, click or rub, no peripheral edema and peripheral pulses strong  PSYCH: mentation appears normal, however patient does do well to cover answers to questions that she is unsure about and is upset when her daughter corrects her.    Diagnostic Test Results:  none     ASSESSMENT/PLAN:     1. Alzheimer's dementia with behavioral disturbance, unspecified timing of dementia onset  Will get CBC and TSH today to evaluate due to history.  Paroxetine has the most anticholinergic effects and side effects of all SSRIs and I have opted to switch her to Citalopram which would be more beneficial.  We have worked her in at the end of November for Neurology which is one month sooner.  I will start low dose Remeron after supper to see if this is beneficial for symptoms which may be sundowners per description from her daughter.  I did discuss that if she does not like the medication she can discontinue this.  Plan to follow-up with establish care visit with new provider.  - CBC with platelets  - citalopram (CELEXA) 10 MG tablet; Take 1 tablet (10 mg) by mouth daily  Dispense: 90 tablet; Refill: 3    2. Episodic tension-type headache, not intractable  Refill given on propranolol for 1 year.  - propranolol (INDERAL) 40 MG tablet; Take 1 tablet (40 mg) by mouth 2 times daily  Dispense: 60 tablet; Refill: 3    3. Postoperative hypothyroidism  TSH to evaluate levels today.  - TSH with free T4 reflex    4. SunDown syndrome  Remeron ordered at low dose to see if this helps " symptoms.  - mirtazapine (REMERON) 7.5 MG TABS tablet; Take 1 tablet (7.5 mg) by mouth At Bedtime  Dispense: 30 tablet; Refill: 1    5. Depression, unspecified depression type  Referral for mental health due to depression secondary to recent changes.  - MENTAL HEALTH REFERRAL  - Adult; Outpatient Treatment; Individual/Couples/Family/Group Therapy/Health Psychology; G: [facility] [phone]; We will contact you to schedule the appointment or please call with any questions    See Patient Instructions    Bernadette Duarte NP  [facility]

## 2018-10-29 NOTE — PATIENT INSTRUCTIONS
1.  Neurology appointment moved up to November 27 at 8:45 a.m.   2.  Stop Paroxetine and start Citalopram at 10mg daily.  3.  Use Remeron 7.5 mg after supper.  4.  You can discontinue Remeron if this is not helpful for symptoms.   5.  Propranolol refilled today.  6.  Labs before you leave.  7.  Make appointment with counselor to discuss your anxieties and issues.  8.  Follow-up with establish care visit in December.

## 2018-10-29 NOTE — MR AVS SNAPSHOT
After Visit Summary   10/29/2018    Demetria France    MRN: [id number]           Patient Information     Date Of Birth          1945        Visit Information        Provider Department      10/29/2018 1:00 PM Bernadette Duarte NP [facility]        Today's Diagnoses     Alzheimer's dementia with behavioral disturbance, unspecified timing of dementia onset    -  1    Episodic tension-type headache, not intractable        Postoperative hypothyroidism        SunDown syndrome        Depression, unspecified depression type          Care Instructions    1.  Neurology appointment moved up to November 27 at 8:45 a.m.   2.  Stop Paroxetine and start Citalopram at 10mg daily.  3.  Use Remeron 7.5 mg after supper.  4.  You can discontinue Remeron if this is not helpful for symptoms.   5.  Propranolol refilled today.  6.  Labs before you leave.  7.  Make appointment with counselor to discuss your anxieties and issues.  8.  Follow-up with [facility] care visit in December.            Follow-ups after your visit        Additional Services     MENTAL HEALTH REFERRAL  - Adult; Outpatient Treatment; Individual/Couples/Family/Group Therapy/Health Psychology; [facility] [phone]; We will contact you to schedule the appointment or please call with any questions       All scheduling is subject to the client's specific insurance plan & benefits, provider/location availability, and provider clinical specialities.  Please arrive 15 minutes early for your first appointment and bring your completed paperwork.    Please be aware that coverage of these services is subject to the terms and limitations of your health insurance plan.  Call member services at your health plan with any benefit or coverage questions.                            Follow-up notes from your care team     Return in about 1 month (around 11/29/2018), or if symptoms worsen or fail to improve, for re check.  "     Your next 10 appointments already scheduled     Nov 27, 2018  8:45 AM CST   [facility]    [address] [place] [address] [phone]              Who to contact     If you have questions or need follow up information about today's clinic visit or your schedule please contact [facility] directly at [phone].  Normal or non-critical lab and imaging results will be communicated to you by MyChart, letter or phone within 4 business days after the clinic has received the results. If you do not hear from us within 7 days, please contact the clinic through MyChart or phone. If you have a critical or abnormal lab result, we will notify you by phone as soon as possible.  Submit refill requests through [employer] or call your pharmacy and they will forward the refill request to us. Please allow 3 business days for your refill to be completed.          Additional Information About Your Visit        Care EveryWhere ID     This is your Care EveryWhere ID. This could be used by other organizations to access your [place] medical records  [id number]        Your Vitals Were     Pulse Temperature Respirations Height Pulse Oximetry BMI (Body Mass Index)    57 98.5  F (36.9  C) (Tympanic) 16 5' 5.75\" (1.67 m) 98% 27 kg/m2       Blood Pressure from Last 3 Encounters:   10/29/18 (!) 138/92   10/02/18 138/70   08/13/18 141/79    Weight from Last 3 Encounters:   10/29/18 166 lb (75.3 kg)   10/02/18 169 lb (76.7 kg)   08/13/18 169 lb 9.6 oz (76.9 kg)              We Performed the Following     CBC with platelets     MENTAL HEALTH REFERRAL  - Adult; Outpatient Treatment; Individual/Couples/Family/Group Therapy/Health Psychology; G: [facility] [phone]; We will contact you to schedule the appointment or please call with any questions     TSH with free T4 reflex          Today's " Medication Changes          These changes are accurate as of 10/29/18  1:46 PM.  If you have any questions, ask your nurse or doctor.               Start taking these medicines.        Dose/Directions    citalopram 10 MG tablet   Commonly known as:  celeXA   Used for:  Alzheimer's dementia with behavioral disturbance, unspecified timing of dementia onset   Started by:  Bernadette Duarte NP        Dose:  10 mg   Take 1 tablet (10 mg) by mouth daily   Quantity:  90 tablet   Refills:  3       mirtazapine 7.5 MG Tabs tablet   Commonly known as:  REMERON   Used for:  SunDown syndrome   Started by:  Bernadette Duarte NP        Dose:  7.5 mg   Take 1 tablet (7.5 mg) by mouth At Bedtime   Quantity:  30 tablet   Refills:  1         Stop taking these medicines if you haven't already. Please contact your care team if you have questions.     PAROXETINE HCL PO   Stopped by:  Bernadette Duarte NP                Where to get your medicines      These medications were sent to [facility] [address] [address]     Phone:  [phone]     citalopram 10 MG tablet    mirtazapine 7.5 MG Tabs tablet    propranolol 40 MG tablet                Primary Care Provider Fax #    Physician No Ref-Primary [phone]       No address on file        Equal Access to Services     RICKEY JACKSON : Edith dislao Soangelinaali, waaxda luqadaha, qaybta kaalmada adeegyada, abigail gonzalez. So [facility] [phone].    ATENCIÓN: Si habla español, tiene a nolen disposición servicios gratuitos de asistencia lingüística. Llame al [phone].    We comply with applicable federal civil rights laws and [place] laws. We do not discriminate on the basis of race, color, national origin, age, disability, sex, sexual orientation, or gender identity.            Thank you!     Thank you for choosing [facility]  for your care. Our  goal is always to provide you with excellent care. Hearing back from our patients is one way we can continue to improve our services. Please take a few minutes to complete the written survey that you may receive in the mail after your visit with us. Thank you!             Your Updated Medication List - Protect others around you: Learn how to safely use, store and throw away your medicines at www.disposemymeds.org.          This list is accurate as of 10/29/18  1:46 PM.  Always use your most recent med list.                   Brand Name Dispense Instructions for use Diagnosis    butalbital-aspirin-caffeine -40 MG per capsule    FIORINAL    42 capsule    Take 1 capsule by mouth every 8 hours as needed for headaches    Muscle spasm       citalopram 10 MG tablet    celeXA    90 tablet    Take 1 tablet (10 mg) by mouth daily    Alzheimer's dementia with behavioral disturbance, unspecified timing of dementia onset       * CLONAZEPAM PO      Take 1 mg by mouth At Bedtime        * clonazePAM 1 MG tablet    klonoPIN    30 tablet    Take 0.5-1 tablets (0.5-1 mg) by mouth 2 times daily as needed for anxiety    Persistent insomnia       donepezil 5 MG tablet    ARIcept    60 tablet    Take 1 tablet (5 mg) by mouth At Bedtime    Memory loss       * LEVOTHROID PO      Take 100 mcg by mouth daily        * levothyroxine 100 MCG tablet    SYNTHROID/LEVOTHROID    90 tablet    Take 1 tablet (100 mcg) by mouth daily    Acquired hypothyroidism       memantine XR 28 MG 24 hr capsule    NAMENDA XR    30 capsule    Take 1 capsule (28 mg) by mouth daily    Muscle spasm       methocarbamol 500 MG tablet    ROBAXIN    60 tablet    Take one at night    Muscle spasm       mirtazapine 7.5 MG Tabs tablet    REMERON    30 tablet    Take 1 tablet (7.5 mg) by mouth At Bedtime    SunDown syndrome       * naproxen 375 MG Tbec   Generic drug:  naproxen      Take by mouth every 12 hours as needed        * naproxen 375 MG tablet    NAPROSYN    60  tablet    Take 1 tablet (375 mg) by mouth 2 times daily (with meals)    Episodic tension-type headache, not intractable       ondansetron 4 MG ODT tab    ZOFRAN-ODT     Take 4 mg by mouth every 8 hours as needed for nausea        PREVAGEN PO           propranolol 40 MG tablet    INDERAL    60 tablet    Take 1 tablet (40 mg) by mouth 2 times daily    Episodic tension-type headache, not intractable       * Notice:  This list has 6 medication(s) that are the same as other medications prescribed for you. Read the directions carefully, and ask your doctor or other care provider to review them with you.

## 2018-11-06 ENCOUNTER — TELEPHONE (OUTPATIENT)
Dept: FAMILY MEDICINE | Facility: CLINIC | Age: 73
End: 2018-11-06

## 2018-11-06 PROBLEM — F32.A DEPRESSION, UNSPECIFIED DEPRESSION TYPE: Status: ACTIVE | Noted: 2018-11-06

## 2018-11-06 NOTE — TELEPHONE ENCOUNTER
PA Initiation    Medication: butal/asa/caf  Insurance Company: [employer] - Phone [phone] Fax [phone]  Pharmacy Filling the Rx: [place], [place] - [address] [place] [address]  Filling Pharmacy Phone: [phone]  Filling Pharmacy Fax:    Start Date: 11/6/2018    THIS HAS BEEN SUBMITTED BY THE PRIOR-AUTHORIZATION TEAM. ANY QUESTIONS PLEASE CALL [phone]. THANK YOU

## 2018-11-06 NOTE — TELEPHONE ENCOUNTER
Prior Authorization Retail Medication Request    Medication/Dose: butal/asa/caf  ICD code (if different than what is on RX):  Headaches G44.209  Previously Tried and Failed:  IBU, NSAIDS--GI upset. tylenol  Rationale:  Pt stable on this med    Insurance Name:  [employer]  Insurance ID:  [id number]      Pharmacy Information (if different than what is on RX)  [facility]  [facility]  Phone:

## 2018-11-06 NOTE — TELEPHONE ENCOUNTER
Fiorinal not covered by [employer].  DX is muscle spasms--is this correct?? Change med or try a prior auth?? If PA I will need to know previous therapies tried and failed for correct DX.

## 2018-11-06 NOTE — TELEPHONE ENCOUNTER
Prior Authorization Approval    Authorization Effective Date: 11/6/2018  Authorization Expiration Date: 12/31/2019  Medication: butal/asa/caf  Approved Dose/Quantity:   Reference #:     Insurance Company: [employer] - Phone [phone] Fax [phone]  Expected CoPay:       CoPay Card Available:      Foundation Assistance Needed:    Which Pharmacy is filling the prescription (Not needed for infusion/clinic administered): [facility] [address] [facility] [place] [address] [place] [address]  Pharmacy Notified: Yes  Patient Notified: Yes

## 2018-11-27 ENCOUNTER — TELEPHONE (OUTPATIENT)
Dept: NEUROLOGY | Facility: CLINIC | Age: 73
End: 2018-11-27

## 2018-11-27 NOTE — TELEPHONE ENCOUNTER
FUTURE VISIT INFORMATION        FUTURE VISIT INFORMATION:    Date: 11/28/18    Time:  1500    Location: [facility]   REFERRAL INFORMATION:    Referring provider:  Nolan Peraza MD    Referring providers clinic:  [facility]    Reason for visit/diagnosis:  Memory      ** I called patient to ask where in [place] she saw neurology, or who she saw.  She said she has no idea but will try to bring the name to her visit with Dr. Jimenez.**  NOTES (FOR ALL VISITS) STATUS DETAILS   OFFICE NOTE from referring provider Printed 10/2/18   OFFICE NOTE from other specialist      DISCHARGE SUMMARY from hospital      DISCHARGE REPORT from the ER     OPERATIVE REPORT      MEDICATION LIST In Epic     IMAGING  (FOR ALL VISITS)       EMG      EEG      MRI (HEAD, NECK, SPINE)                  OTHER

## 2018-11-28 ENCOUNTER — OFFICE VISIT (OUTPATIENT)
Dept: NEUROLOGY | Facility: CLINIC | Age: 73
End: 2018-11-28
Payer: COMMERCIAL

## 2018-11-28 VITALS
SYSTOLIC BLOOD PRESSURE: 117 MMHG | RESPIRATION RATE: 12 BRPM | HEART RATE: 52 BPM | TEMPERATURE: 98.3 F | DIASTOLIC BLOOD PRESSURE: 73 MMHG

## 2018-11-28 DIAGNOSIS — F02.818 ALZHEIMER'S DISEASE OF OTHER ONSET WITH BEHAVIORAL DISTURBANCE: Primary | ICD-10-CM

## 2018-11-28 DIAGNOSIS — G30.8 ALZHEIMER'S DISEASE OF OTHER ONSET WITH BEHAVIORAL DISTURBANCE: Primary | ICD-10-CM

## 2018-11-28 DIAGNOSIS — R41.3 MEMORY LOSS: ICD-10-CM

## 2018-11-28 PROCEDURE — 99205 OFFICE O/P NEW HI 60 MIN: CPT | Performed by: PSYCHIATRY & NEUROLOGY

## 2018-11-28 RX ORDER — DONEPEZIL HYDROCHLORIDE 5 MG/1
10 TABLET, FILM COATED ORAL AT BEDTIME
Qty: 60 TABLET | Refills: 5 | Status: SHIPPED | OUTPATIENT
Start: 2018-11-28

## 2018-11-28 ASSESSMENT — MONTREAL COGNITIVE ASSESSMENT (MOCA)
WHAT IS THE TOTAL SCORE (OUT OF 30): 19
7. [VIGILENCE] TAP WHEN HEARING DESIGNATED LETTER: 1
6. READ LIST OF DIGITS [FORWARD/BACKWARD]: 2
VISUOSPATIAL/EXECUTIVE SUBSCORE: 3
12. MEMORY INDEX SCORE: 0
9. REPEAT EACH SENTENCE: 2
4. NAME EACH OF THE THREE ANIMALS SHOWN: 2
WHAT LEVEL OF EDUCATION WAS ATTAINED: 0
11. FOR EACH PAIR OF WORDS, WHAT CATEGORY DO THEY BELONG TO (OUT OF 2): 2
13. ORIENTATION SUBSCORE: 4
10. [FLUENCY] NAME WORDS STARTING WITH DESIGNATED LETTER: 1
8. SERIAL SUBTRACTION OF 7S: 2

## 2018-11-28 NOTE — LETTER
11/28/2018         RE: Demetria France  [address]  [facility] [address]        Dear Colleague,    Thank you for referring your patient, Demetria France, to the [facility]. Please see a copy of my visit note below.    INITIAL NEUROLOGY CONSULTATION    DATE OF VISIT: 11/28/2018  MRN: [id number]  PATIENT NAME: Demetria France  YOB: 1945    REFERRING PROVIDER: No ref. provider found    Chief Complaint   Patient presents with     New Patient     Memory.  Referral by Nolan Helm MD.       SUBJECTIVE:                                                      HPI:   Demetria France is a 73 year old female whom I was asked by Dr. Peraza to see in consultation for memory problems. Per chart review, the patient was seen by a neurologist in [place], and they reportedly said she had  early memory loss.  She is on both Aricept and Namenda. She moved to [place] so that her daughter could help care for her. She was switched from paroxetine (due to its anticholinergic effects) to Celexa for mood late last month. She was also given Remeron for symptoms of sundowning. She was also referred to mental health. We do not have the patient s previous neurology notes.    The patient has additional history of hypothyroidism, tension-type headaches, insomnia (on Klonopin, notably).      The patient clarifies that she was diagnosed with Alzheimer's a few years ago. She says she had a hard time remembering things. She says that she functions just fine though. She is living with her daughter in [place] and tells me that she is making plans to move to [place] by February. She does not plan to follow up with me in the future.     She does not have any side  Effects from the Aricept (5mg) or Namenda (28mg XR). She does have occasional upset stomach, but this is not new. Not worse with the medications.    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    The patient says her  daughter tends to exaggerate. She is hesitant to let her talk to me but agrees to briefly.     Daughter joins us and provides additional history. The Remeron does seem to be helping the sundowning, dramatically.  The patient was having some hallucinations before she was on the Remeron. She was wandering at night. Now, the patient is sleeping well.    She is not as clear when she gets stressed out. Her mood is generally good.   No late bills (patient does her own finances).  Daughter does not have safety concerns about driving.    Daughter asks about increasing the Aricept. This was going to be done through the neurologist they saw in [place], but they came here instead. That being said, the daughter confirms that the patient plans to move to assisted living in [place] and continue her medical care there. They do not know what type of work-up has been done regarding the memory.     Her father and brother had dementia. Her brother was diagnosed with Parkinson's.     No past medical history on file.  No past surgical history on file.      Current Outpatient Prescriptions on File Prior to Visit:  Apoaequorin (PREVAGEN PO) Take 1 tablet by mouth daily    butalbital-aspirin-caffeine (FIORINAL) -40 MG per capsule Take 1 capsule by mouth every 8 hours as needed for headaches   citalopram (CELEXA) 10 MG tablet Take 1 tablet (10 mg) by mouth daily   clonazePAM (KLONOPIN) 1 MG tablet Take 0.5-1 tablets (0.5-1 mg) by mouth 2 times daily as needed for anxiety   CLONAZEPAM PO Take 1 mg by mouth At Bedtime   memantine XR (NAMENDA XR) 28 MG 24 hr capsule Take 1 capsule (28 mg) by mouth daily   methocarbamol (ROBAXIN) 500 MG tablet Take one at night   naproxen (NAPROSYN) 375 MG tablet Take 1 tablet (375 mg) by mouth 2 times daily (with meals)   ondansetron (ZOFRAN-ODT) 4 MG ODT tab Take 4 mg by mouth every 8 hours as needed for nausea   propranolol (INDERAL) 40 MG tablet Take 1 tablet (40 mg) by mouth 2 times daily    levothyroxine (SYNTHROID/LEVOTHROID) 100 MCG tablet Take 1 tablet (100 mcg) by mouth daily   mirtazapine (REMERON) 7.5 MG TABS tablet Take 1 tablet (7.5 mg) by mouth At Bedtime     No current facility-administered medications on file prior to visit.   Allergies   Allergen Reactions     Penicillins         Problem (# of Occurrences) Relation (Name,Age of Onset)    Dementia (1) Father    Emphysema (1) Father    Heart Disease (1) Mother    Parkinsonism (1) Brother        Social History   Substance Use Topics     Smoking status: Never Smoker     Smokeless tobacco: Never Used     Alcohol use Not on file       REVIEW OF SYSTEMS:                                                      Right leg pain (prev US noted) . Otherwise 10-point review of systems is negative except as mentioned above in HPI.     EXAM:                                                      Physical Exam:   Vitals: /73 (BP Location: Left arm, Patient Position: Sitting, Cuff Size: Adult Large)  Pulse 52  Temp 98.3  F (36.8  C) (Tympanic)  Resp 12  Breastfeeding? No  BMI= There is no height or weight on file to calculate BMI.  GENERAL: NAD.   Neurologic:  MENTAL STATUS: Alert, attentive. Speech is fluent. Normal comprehension. Normal concentration. Fair fund of knowledge. MoCA: 19/30 (normal is 26 and above).   CRANIAL NERVES: Visual fields intact to confrontation. Pupils equally, round and reactive to light. Facial sensation and movement normal. EOM full. Hearing intact to conversation. Trapezius strength intact. Palate moves symmetrically. Tongue midline.  MOTOR: 5/5 in proximal and distal muscle groups of upper and lower extremities. Tone and bulk normal.   DTRs: Intact and symmetric. Babinski down-going bilaterally.   SENSATION: Normal light touch and pinprick. Intact proprioception. Vibration: Decreased at both ankles.   COORDINATION: Normal finger nose finger. Finger tapping normal. Knee heel shin normal.  STATION AND GAIT: Romberg  negative. Casual gait is normal.  CV: RRR. S1, S2.   NECK: No bruits.  Left hand-dominant.     ASSESSMENT and PLAN:                                                      Assessment and Plan:     ICD-10-CM    1. Alzheimer's disease of other onset with behavioral disturbance G30.8     F02.81    2. Memory loss R41.3 donepezil (ARICEPT) 5 MG tablet       Ms. France is a pleasant 72 yo woman coming to me with prior diagnosis, by report, of Alzheimer's. Unfortunately I do not have any records regarding the prior work-up. Because the patient does not follow here, we will simply make the increase in the Aricept I am told that was planned, which is a reasonable step. Based on the exam today, she has moderate dementia. No other appreciable neurologic findings. She will follow up in [place]. I asked the patient and her daughter to let us know if there are questions/concerns in the meantime. They agree with the plan.      Patient Instructions:  We will increase the Aricept from 5mg to 10mg (ok to start this tonight).  Continue current dose of Namenda.  Follow-up in [place], with your neurologist there, once you move. I recommend you be seen in about 6 months.     Total Time: 60 minutes were spent with the patient. More than 50% of the time spent on counseling/coordinating the care.    Debbie Jimenez MD  Neurology    CC: Nolan Peraza MD    Again, thank you for allowing me to participate in the care of your patient.        Sincerely,        Debbie Jimenez MD

## 2018-11-28 NOTE — PROGRESS NOTES
INITIAL NEUROLOGY CONSULTATION    DATE OF VISIT: 11/28/2018  MRN: [id number]  PATIENT NAME: Demetria France  YOB: 1945    REFERRING PROVIDER: No ref. provider found    Chief Complaint   Patient presents with     New Patient     Memory.  Referral by Nolan Helm MD.       SUBJECTIVE:                                                      HPI:   Demetria France is a 73 year old female whom I was asked by Dr. Peraza to see in consultation for memory problems. Per chart review, the patient was seen by a neurologist in [place], and they reportedly said she had  early memory loss.  She is on both Aricept and Namenda. She moved to [place] so that her daughter could help care for her. She was switched from paroxetine (due to its anticholinergic effects) to Celexa for mood late last month. She was also given Remeron for symptoms of sundowning. She was also referred to mental health. We do not have the patient s previous neurology notes.    The patient has additional history of hypothyroidism, tension-type headaches, insomnia (on Klonopin, notably).      The patient clarifies that she was diagnosed with Alzheimer's a few years ago. She says she had a hard time remembering things. She says that she functions just fine though. She is living with her daughter in [place] and tells me that she is making plans to move to [place] by February. She does not plan to follow up with me in the future.     She does not have any side  Effects from the Aricept (5mg) or Namenda (28mg XR). She does have occasional upset stomach, but this is not new. Not worse with the medications.    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    The patient says her daughter tends to exaggerate. She is hesitant to let her talk to me but agrees to briefly.     Daughter joins us and provides additional history. The Remeron does seem to be helping the sundowning, dramatically.  The patient was having some  hallucinations before she was on the Remeron. She was wandering at night. Now, the patient is sleeping well.    She is not as clear when she gets stressed out. Her mood is generally good.   No late bills (patient does her own finances).  Daughter does not have safety concerns about driving.    Daughter asks about increasing the Aricept. This was going to be done through the neurologist they saw in [place], but they came here instead. That being said, the daughter confirms that the patient plans to move to assisted living in [place] and continue her medical care there. They do not know what type of work-up has been done regarding the memory.     Her father and brother had dementia. Her brother was diagnosed with Parkinson's.     No past medical history on file.  No past surgical history on file.      Current Outpatient Prescriptions on File Prior to Visit:  Apoaequorin (PREVAGEN PO) Take 1 tablet by mouth daily    butalbital-aspirin-caffeine (FIORINAL) -40 MG per capsule Take 1 capsule by mouth every 8 hours as needed for headaches   citalopram (CELEXA) 10 MG tablet Take 1 tablet (10 mg) by mouth daily   clonazePAM (KLONOPIN) 1 MG tablet Take 0.5-1 tablets (0.5-1 mg) by mouth 2 times daily as needed for anxiety   CLONAZEPAM PO Take 1 mg by mouth At Bedtime   memantine XR (NAMENDA XR) 28 MG 24 hr capsule Take 1 capsule (28 mg) by mouth daily   methocarbamol (ROBAXIN) 500 MG tablet Take one at night   naproxen (NAPROSYN) 375 MG tablet Take 1 tablet (375 mg) by mouth 2 times daily (with meals)   ondansetron (ZOFRAN-ODT) 4 MG ODT tab Take 4 mg by mouth every 8 hours as needed for nausea   propranolol (INDERAL) 40 MG tablet Take 1 tablet (40 mg) by mouth 2 times daily   levothyroxine (SYNTHROID/LEVOTHROID) 100 MCG tablet Take 1 tablet (100 mcg) by mouth daily   mirtazapine (REMERON) 7.5 MG TABS tablet Take 1 tablet (7.5 mg) by mouth At Bedtime     No current facility-administered medications on file prior to visit.    Allergies   Allergen Reactions     Penicillins         Problem (# of Occurrences) Relation (Name,Age of Onset)    Dementia (1) Father    Emphysema (1) Father    Heart Disease (1) Mother    Parkinsonism (1) Brother        Social History   Substance Use Topics     Smoking status: Never Smoker     Smokeless tobacco: Never Used     Alcohol use Not on file       REVIEW OF SYSTEMS:                                                      Right leg pain (prev US noted) . Otherwise 10-point review of systems is negative except as mentioned above in HPI.     EXAM:                                                      Physical Exam:   Vitals: /73 (BP Location: Left arm, Patient Position: Sitting, Cuff Size: Adult Large)  Pulse 52  Temp 98.3  F (36.8  C) (Tympanic)  Resp 12  Breastfeeding? No  BMI= There is no height or weight on file to calculate BMI.  GENERAL: NAD.   Neurologic:  MENTAL STATUS: Alert, attentive. Speech is fluent. Normal comprehension. Normal concentration. Fair fund of knowledge. MoCA: 19/30 (normal is 26 and above).   CRANIAL NERVES: Visual fields intact to confrontation. Pupils equally, round and reactive to light. Facial sensation and movement normal. EOM full. Hearing intact to conversation. Trapezius strength intact. Palate moves symmetrically. Tongue midline.  MOTOR: 5/5 in proximal and distal muscle groups of upper and lower extremities. Tone and bulk normal.   DTRs: Intact and symmetric. Babinski down-going bilaterally.   SENSATION: Normal light touch and pinprick. Intact proprioception. Vibration: Decreased at both ankles.   COORDINATION: Normal finger nose finger. Finger tapping normal. Knee heel shin normal.  STATION AND GAIT: Romberg negative. Casual gait is normal.  CV: RRR. S1, S2.   NECK: No bruits.  Left hand-dominant.     ASSESSMENT and PLAN:                                                      Assessment and Plan:     ICD-10-CM    1. Alzheimer's disease of other onset with behavioral  disturbance G30.8     F02.81    2. Memory loss R41.3 donepezil (ARICEPT) 5 MG tablet       Ms. France is a pleasant 74 yo woman coming to me with prior diagnosis, by report, of Alzheimer's. Unfortunately I do not have any records regarding the prior work-up. Because the patient does not follow here, we will simply make the increase in the Aricept I am told that was planned, which is a reasonable step. Based on the exam today, she has moderate dementia. No other appreciable neurologic findings. She will follow up in [place]. I asked the patient and her daughter to let us know if there are questions/concerns in the meantime. They agree with the plan.      Patient Instructions:  We will increase the Aricept from 5mg to 10mg (ok to start this tonight).  Continue current dose of Namenda.  Follow-up in [place], with your neurologist there, once you move. I recommend you be seen in about 6 months.     Total Time: 60 minutes were spent with the patient. More than 50% of the time spent on counseling/coordinating the care.    Debbie Jimenez MD  Neurology    CC: Nolan Peraza MD

## 2018-11-28 NOTE — MR AVS SNAPSHOT
After Visit Summary   11/28/2018    Demetria France    MRN: [id number]           Patient Information     Date Of Birth          1945        Visit Information        Provider Department      11/28/2018 3:00 PM Debbie Joseph MD [facility]        Today's Diagnoses     Memory loss          Care Instructions    Plan:    We will increase the Aricept from 5mg to 10mg (ok to start this tonight).  Continue current dose of Namenda.  Follow-up in [place], with your neurologist there, once you move. I recommend you be seen in about 6 months.             Follow-ups after your visit        Your next 10 appointments already scheduled     Dec 07, 2018 11:30 AM CST   (Arrive by 11:00 AM)   New Visit with Emma Jones [facility] ([facility])    [address] [place] [id number] [phone]            Dec 14, 2018  2:30 PM CST   Return Visit with Emma Jones [facility] ([facility])    [address] [place] [id number]   [phone]              Who to contact     If you have questions or need follow up information about today's clinic visit or your schedule please contact [facility] directly at [phone].  Normal or non-critical lab and imaging results will be communicated to you by MyChart, letter or phone within 4 business days after the clinic has received the results. If you do not hear from us within 7 days, please contact the clinic through MyChart or phone. If you have a critical or abnormal lab result, we will notify you by phone as soon as possible.  Submit refill requests through [employer] or call your pharmacy and they will forward the refill request to us. Please allow 3 business days for your refill to be completed.          Additional Information About Your Visit        Care EveryWhere ID     This is your Care EveryWhere ID.  This could be used by other organizations to access your [facility] medical records  [id number]        Your Vitals Were     Pulse Temperature Respirations Breastfeeding?          52 98.3  F (36.8  C) (Tympanic) 12 No         Blood Pressure from Last 3 Encounters:   11/28/18 117/73   10/29/18 (!) 138/92   10/02/18 138/70    Weight from Last 3 Encounters:   10/29/18 75.3 kg (166 lb)   10/02/18 76.7 kg (169 lb)   08/13/18 76.9 kg (169 lb 9.6 oz)              Today, you had the following     No orders found for display         Today's Medication Changes          These changes are accurate as of 11/28/18  3:44 PM.  If you have any questions, ask your nurse or doctor.               These medicines have changed or have updated prescriptions.        Dose/Directions    donepezil 5 MG tablet   Commonly known as:  ARICEPT   This may have changed:  how much to take   Used for:  Memory loss   Changed by:  Debbie Joseph MD        Dose:  10 mg   Take 2 tablets (10 mg) by mouth At Bedtime   Quantity:  60 tablet   Refills:  5            Where to get your medicines      Information about where to get these medications is not yet available     ! Ask your nurse or doctor about these medications     donepezil 5 MG tablet                Primary Care Provider Fax #    Physician No Ref-Primary [phone]       No address on file        Equal Access to Services     RICKEY JACKSON AH: Edith smith Sojustin, waaxda luqadaha, qaybta kaalmaana jacobo, abigail gonzalez. So [facility] [phone].    ATENCIÓN: Si habla español, tiene a nolen disposición servicios gratuitos de asistencia lingüística. Llame al [phone].    We comply with applicable federal civil rights laws and [place] laws. We do not discriminate on the basis of race, color, national origin, age, disability, sex, sexual orientation, or gender identity.            Thank you!     Thank you for choosing [facility]  for  your care. Our goal is always to provide you with excellent care. Hearing back from our patients is one way we can continue to improve our services. Please take a few minutes to complete the written survey that you may receive in the mail after your visit with us. Thank you!             Your Updated Medication List - Protect others around you: Learn how to safely use, store and throw away your medicines at www.disposemymeds.org.          This list is accurate as of 11/28/18  3:44 PM.  Always use your most recent med list.                   Brand Name Dispense Instructions for use Diagnosis    butalbital-aspirin-caffeine -40 MG per capsule    FIORINAL    42 capsule    Take 1 capsule by mouth every 8 hours as needed for headaches    Muscle spasm       citalopram 10 MG tablet    celeXA    90 tablet    Take 1 tablet (10 mg) by mouth daily    Alzheimer's dementia with behavioral disturbance, unspecified timing of dementia onset       * CLONAZEPAM PO      Take 1 mg by mouth At Bedtime        * clonazePAM 1 MG tablet    klonoPIN    30 tablet    Take 0.5-1 tablets (0.5-1 mg) by mouth 2 times daily as needed for anxiety    Persistent insomnia       donepezil 5 MG tablet    ARICEPT    60 tablet    Take 2 tablets (10 mg) by mouth At Bedtime    Memory loss       * LEVOTHROID PO      Take 100 mcg by mouth daily        * levothyroxine 100 MCG tablet    SYNTHROID/LEVOTHROID    90 tablet    Take 1 tablet (100 mcg) by mouth daily    Acquired hypothyroidism       memantine XR 28 MG 24 hr capsule    NAMENDA XR    30 capsule    Take 1 capsule (28 mg) by mouth daily    Muscle spasm       methocarbamol 500 MG tablet    ROBAXIN    60 tablet    Take one at night    Muscle spasm       mirtazapine 7.5 MG tablet    REMERON    30 tablet    Take 1 tablet (7.5 mg) by mouth At Bedtime    SunDown syndrome       * naproxen 375 MG Tbec   Generic drug:  naproxen      Take by mouth every 12 hours as needed        * naproxen 375 MG tablet     NAPROSYN    60 tablet    Take 1 tablet (375 mg) by mouth 2 times daily (with meals)    Episodic tension-type headache, not intractable       ondansetron 4 MG ODT tab    ZOFRAN-ODT     Take 4 mg by mouth every 8 hours as needed for nausea        PREVAGEN PO      Take 1 tablet by mouth daily        propranolol 40 MG tablet    INDERAL    60 tablet    Take 1 tablet (40 mg) by mouth 2 times daily    Episodic tension-type headache, not intractable       * Notice:  This list has 6 medication(s) that are the same as other medications prescribed for you. Read the directions carefully, and ask your doctor or other care provider to review them with you.

## 2018-12-16 DIAGNOSIS — G47.00 PERSISTENT INSOMNIA: ICD-10-CM

## 2018-12-17 NOTE — TELEPHONE ENCOUNTER
Requested Prescriptions   Pending Prescriptions Disp Refills     clonazePAM (KLONOPIN) 1 MG tablet [Pharmacy Med Name: CLONAZEPAM 1MG      TAB] 30 tablet 1     Sig: TAKE 1/2 TO 1 (ONE-HALF TO ONE) TABLET BY MOUTH TWICE DAILY AS NEEDED FOR ANXIETY    There is no refill protocol information for this order      clonazePAM (KLONOPIN) 1 MG tablet  Last Written Prescription Date:  10/02/2018  Last Fill Quantity: 30 tablet,  # refills: 3   Last office visit: 10/29/2018 with prescribing provider:  PRISCILLA Duarte   Future Office Visit:      Jasmyn KNAPP (SHERMAN) (M)

## 2018-12-17 NOTE — TELEPHONE ENCOUNTER
RX [place] ([place]) reviewed:  reviewed- no concerns    [place]:  [email or web address]

## 2018-12-18 DIAGNOSIS — G47.00 PERSISTENT INSOMNIA: ICD-10-CM

## 2018-12-18 DIAGNOSIS — F05 SUNDOWN SYNDROME: ICD-10-CM

## 2018-12-18 RX ORDER — CLONAZEPAM 1 MG/1
TABLET ORAL
Qty: 30 TABLET | Refills: 1 | Status: SHIPPED | OUTPATIENT
Start: 2018-12-18 | End: 2018-12-18

## 2018-12-18 RX ORDER — MIRTAZAPINE 7.5 MG/1
7.5 TABLET, FILM COATED ORAL AT BEDTIME
Qty: 30 TABLET | Refills: 1 | Status: SHIPPED | OUTPATIENT
Start: 2018-12-18 | End: 2019-01-13

## 2018-12-19 RX ORDER — CLONAZEPAM 1 MG/1
TABLET ORAL
Qty: 30 TABLET | Refills: 1 | Status: SHIPPED | OUTPATIENT
Start: 2018-12-19

## 2019-01-02 ENCOUNTER — PATIENT OUTREACH (OUTPATIENT)
Dept: CARE COORDINATION | Facility: CLINIC | Age: 74
End: 2019-01-02

## 2019-01-02 ENCOUNTER — TELEPHONE (OUTPATIENT)
Dept: FAMILY MEDICINE | Facility: CLINIC | Age: 74
End: 2019-01-02

## 2019-01-02 DIAGNOSIS — M62.838 MUSCLE SPASM: ICD-10-CM

## 2019-01-02 DIAGNOSIS — F03.90 DEMENTIA (H): Primary | ICD-10-CM

## 2019-01-02 NOTE — LETTER
[facility]  [address] [place] [address]  [phone]    January 2, 2019    Demetria France  [address]  [facility] [address]      Dear Demetria,    I am a clinic care coordinator who works with the [facility]. I wanted to thank you for spending the time to talk with me.  I wanted to introduce myself and provide you with my contact information so that you can call me with questions or concerns about your health care. Below is a description of clinic care coordination and how I can further assist you.     The clinic care coordinator is a registered nurse and/or  who understand the health care system. The goal of clinic care coordination is to help you manage your health and improve access to the [facility] system in the most efficient manner. The registered nurse can assist you in meeting your health care goals by providing education, coordinating services, and strengthening the communication among your providers. The  can assist you with financial, behavioral, psychosocial, chemical dependency, counseling, and/or psychiatric resources.    Please feel free to contact me at [phone], with any questions or concerns. We at [facility] are focused on providing you with the highest-quality healthcare experience possible and that all starts with you.     Sincerely,     Bernadette Gomez    Enclosed: I have enclosed a copy of a 24 Hour Access Plan. This has helpful phone numbers for you to call when needed. Please keep this in an easy to access place to use as needed.

## 2019-01-02 NOTE — LETTER
Health Care Home - Access Care Plan    About Me  Patient Name:  Demetria France    YOB: 1945  Age:                             73 year old   Alejandro MRN:            [id number] Telephone Information:   Home Phone [phone]   Mobile Not on file.       Address:    [address]  [facility] [address] Email address:  No e-mail address on record      Emergency Contact(s)  Name Relationship Lgl Grd Work Phone Home Phone Mobile Phone   1. KURTMARVINVERONICAJAKI HARRELL* Daughter    [phone]             Health Maintenance: Routine Health maintenance Reviewed: Due/Overdue   Health Maintenance Due   Topic Date Due     DEPRESSION ACTION PLAN  04/21/1964     PHQ-9 Q6 MONTHS  04/21/1964     HEPATITIS C SCREENING  04/21/1964     DTAP/TDAP/TD IMMUNIZATION (1 - Tdap) 04/21/1971     MAMMO SCREEN Q2 YR (SYSTEM ASSIGNED)  04/21/1986     LIPID SCREEN Q5 YR FEMALE (SYSTEM ASSIGNED)  04/21/1991     COLON CANCER SCREEN (SYSTEM ASSIGNED)  04/21/1996     ZOSTER IMMUNIZATION (1 of 2) 04/21/1996     ADVANCE DIRECTIVE PLANNING Q5 YRS  04/21/2001     FALL RISK ASSESSMENT  04/21/2011     DEXA SCAN SCREENING (SYSTEM ASSIGNED)  04/21/2011     PNEUMOVAX IMMUNIZATION 65+ LOW/MEDIUM RISK (1 of 2 - PCV13) 04/21/2011     Please talk with your provider about what is needed or can continue to wait.        My Access Plan  Medical Emergency 911   Questions or concerns during clinic hours Primary Clinic Line, I will call the clinic directly: [facility] [phone]   24 Hour Appointment Line [phone] or  [phone]) (toll free)   24 Hour Nurse Line [phone] (toll free)   Questions or concerns outside clinic hours 24 Hour Appointment Line, I will call the after-hours on-call line:   [facility] [phone] or [phone] ([phone]) (toll-free)   Preferred Urgent Care [facility] [phone]   [facility] [place], [place]   [phone]   Preferred Pharmacy [facility], [place] - [address] [place] [address]     Behavioral Health Crisis Line The National Suicide Prevention Lifeline at 1-920.320.1695 or 911     My Care Team Members  Patient Care Team       Relationship Specialty Notifications Start End    No Ref-Primary, Physician PCP - General   8/6/18     Fax: [phone]         Nolan Peraza MD PCP - Assigned PCP   7/26/18     Phone: [phone] Fax: [phone]         [address] [facility] [address]    Bernadette Gomez LSW Clinic Care Coordinator Primary Care - [facility] Admissions 1/2/19     Phone: [phone] Fax: [phone]               My Medical and Care Information  Problem List   Patient Active Problem List   Diagnosis     Acquired hypothyroidism     Depression, unspecified depression type      Current Medications and Allergies:  See printed Medication Report

## 2019-01-02 NOTE — TELEPHONE ENCOUNTER
Daughter Aurea is requesting help assisting  Getting her mother into a Assisted Living Center.  Pt is fighting this and Pt is not capable to live by herself.  Mckayla [place] Sec

## 2019-01-02 NOTE — PROGRESS NOTES
Clinic Care Coordination Contact    Clinic Care Coordination Contact  OUTREACH    Referral Information:  Referral Source: PCP    Primary Diagnosis: Cognitive Impairment    Chief Complaint   Patient presents with     Clinic Care Coordination - Initial     sw        Universal Utilization: no concerns  Clinic Utilization  Compliance Concerns: Yes(potential related to dementia/alzhimers's)  No PCP office visit in Past Year: No  Utilization    Last refreshed: 1/2/2019 12:35 PM:  Hospital Admissions 0           Last refreshed: 1/2/2019 12:35 PM:  ED Visits 1           Last refreshed: 1/2/2019 12:35 PM:  No Show Count (past year) 3              Current as of: 1/2/2019 12:35 PM              Clinical Concerns:  Current Medical Concerns:  Not fully discussed.     Current Behavioral Concerns: pt is refusing AL that was previously agreed upon.  Per daughter pt is hiding medications, mail, and other concerns.      Education Provided to patient: Long talk about pt's refusals of care and this was the focus of the conversation.  Daughter is feeling caregiver burn out while trying to keep pt safe.  Pt is saying she does not need the help and wants to live independently.  Daughter wanted to know how to get pt to have to move into an AL when she no longer wants to.  Discussed calling pt in as a Vulnerable Adult, calling the senior linkage line, and Alzheimer's association help line.  Reviewed that if she calls the pt in as a vulnerable adult the more facts of how she is neglecting her self care,with details and how it is affecting her, the better.  Discussed that pt does need to be determined that she can not make her own decisions in order to force her to move into a facility.  Strongly encouraged daughter to talk with the SLL or Alz assoc to have a good understanding of what is needed.      Health Maintenance Reviewed: Due/Overdue   Health Maintenance Due   Topic Date Due     DEPRESSION ACTION PLAN  04/21/1964     PHQ-9 Q6 MONTHS   04/21/1964     HEPATITIS C SCREENING  04/21/1964     DTAP/TDAP/TD IMMUNIZATION (1 - Tdap) 04/21/1971     MAMMO SCREEN Q2 YR (SYSTEM ASSIGNED)  04/21/1986     LIPID SCREEN Q5 YR FEMALE (SYSTEM ASSIGNED)  04/21/1991     COLON CANCER SCREEN (SYSTEM ASSIGNED)  04/21/1996     ZOSTER IMMUNIZATION (1 of 2) 04/21/1996     ADVANCE DIRECTIVE PLANNING Q5 YRS  04/21/2001     FALL RISK ASSESSMENT  04/21/2011     DEXA SCAN SCREENING (SYSTEM ASSIGNED)  04/21/2011     PNEUMOVAX IMMUNIZATION 65+ LOW/MEDIUM RISK (1 of 2 - PCV13) 04/21/2011       Clinical Pathway: None    Medication Management:  Daughter assists yet daughter states pt does not always take her medications properly.   Per chart review she is asking for refills before they are due indicating either in appropriate usage or not understanding when to refill.     Functional Status:       Living Situation:  Current living arrangement:: I live in a private home with family  Type of residence:: Private home - stairs    Diet/Exercise/Sleep:       Transportation:  Transportation concerns (GOAL):: No  Transportation means:: Family     Psychosocial:  Mental health DX:: Yes  Mental health DX how managed:: Medication  Mental health management concern (GOAL):: Yes(potential, depression and alzheimers are noted as co-occuring issues)  Informal Support system:: Children     Financial/Insurance:      Focus of conversation was on pt's dementia/alzheimer's and getting supports for the daughter.  Was not able to do full assessment with daughter during this initial call.  Will continue to assess on subsequent calls.      Resources and Interventions:  Current Resources:   none  Community Resources: None        Advance Care Plan/Directive  Advanced Care Plans/Directives on file:: No    Referrals Placed: Alzheimer's Association, Senior Linkage Line, [facility] Resources     Goals:         Patient/Caregiver understanding: n/a    Outreach Frequency: weekly      Plan: daughter to call resources given.   Sw to send out introduction letter and access plan.  Sw to call in about one week.     JACK Mendez, Clinic Care Coordinator 1/2/2019   1:17 PM  [phone]

## 2019-01-02 NOTE — TELEPHONE ENCOUNTER
Care coordination referral placed. Pt is currently living with daughter who can't care for her. Already reported to . Denae Harris RN

## 2019-01-03 RX ORDER — MEMANTINE HYDROCHLORIDE 28 MG/1
CAPSULE, EXTENDED RELEASE ORAL
Qty: 90 CAPSULE | Refills: 1 | Status: SHIPPED | OUTPATIENT
Start: 2019-01-03

## 2019-01-03 NOTE — TELEPHONE ENCOUNTER
"Requested Prescriptions   Pending Prescriptions Disp Refills     memantine XR (NAMENDA XR) 28 MG 24 hr capsule [Pharmacy Med Name: MEMANTINE HC ER 28MGCAP] 90 capsule 0     Sig: TAKE 1 CAPSULE BY MOUTH ONCE DAILY    Miscellaneous Dementia Agents Passed - 1/2/2019  7:07 PM       Passed - Recent (12 mo) or future (30 days) visit within the authorizing provider's specialty    Patient had office visit in the last 12 months or has a visit in the next 30 days with authorizing provider or within the authorizing provider's specialty.  See \"Patient Info\" tab in inbasket, or \"Choose Columns\" in Meds & Orders section of the refill encounter.      Last Written Prescription Date:  10/2/18  Last Fill Quantity: 30,  # refills: 3   Last office visit: 10/29/2018 with prescribing provider:     Future Office Visit:               Passed - Patient is 18 years of age or older          "

## 2019-01-07 ENCOUNTER — TELEPHONE (OUTPATIENT)
Dept: FAMILY MEDICINE | Facility: CLINIC | Age: 74
End: 2019-01-07

## 2019-01-07 DIAGNOSIS — G44.219 EPISODIC TENSION-TYPE HEADACHE, NOT INTRACTABLE: ICD-10-CM

## 2019-01-07 NOTE — TELEPHONE ENCOUNTER
"Requested Prescriptions   Pending Prescriptions Disp Refills     naproxen (NAPROSYN) 375 MG tablet [Pharmacy Med Name: NAPROXEN 375MG      TAB] 60 tablet 1     Sig: TAKE 1 TABLET BY MOUTH TWICE DAILY WITH MEALS    NSAID Medications Failed - 1/7/2019 12:36 PM       Failed - Normal ALT on file in past 12 months    No lab results found.         Failed - Normal AST on file in past 12 months    No lab results found.         Failed - Patient is age 6-64 years       Failed - Normal serum creatinine on file in past 12 months    No lab results found.         Passed - Blood pressure under 140/90 in past 12 months    BP Readings from Last 3 Encounters:   11/28/18 117/73   10/29/18 (!) 138/92   10/02/18 138/70                Passed - Recent (12 mo) or future (30 days) visit within the authorizing provider's specialty    Patient had office visit in the last 12 months or has a visit in the next 30 days with authorizing provider or within the authorizing provider's specialty.  See \"Patient Info\" tab in inbasket, or \"Choose Columns\" in Meds & Orders section of the refill encounter.      Last Written Prescription Date:  10/2/18  Last Fill Quantity: 45,  # refills: 3   Last office visit: 10/29/2018 with prescribing provider:     Future Office Visit:               Passed - Normal CBC on file in past 12 months    Recent Labs   Lab Test 10/29/18  1352   WBC 7.2   RBC 4.45   HGB 13.8   HCT 43.1   *                Passed - Medication is active on med list       Passed - No active pregnancy on record       Passed - No positive pregnancy test in past 12 months          "

## 2019-01-07 NOTE — TELEPHONE ENCOUNTER
Newton Barros Pharmacist     Concerned pt continues to order medications she should not be out. Pt has Dementia and not understanding her medication refill etc.  Pt says she is out what is she to do. Running out in 3 days after picking them up.  Pharmacist said he did talk with sister    They are concerned about her medication refills.    Please contact pt   Danforth [facility] [place] Sec

## 2019-01-08 ENCOUNTER — PATIENT OUTREACH (OUTPATIENT)
Dept: CARE COORDINATION | Facility: CLINIC | Age: 74
End: 2019-01-08

## 2019-01-08 NOTE — PROGRESS NOTES
Clinic Care Coordination Contact  [facility]/Voicemail  Clinic RN asked for outreach as pt is calling in for refills sometimes 3 days after picking up a medication at the pharmacy - see note from 1-7-19    Referral Source: PCP  Clinical Data: Care Coordinator Outreach  Outreach attempted x 1.  Left message on voicemail with call back information and requested return call.  Plan:  Care Coordinator will try to reach patient again in 3-5 business days.  JACK Mendez, Clinic Care Coordinator 1/8/2019   9:34 AM  [phone]

## 2019-01-08 NOTE — LETTER
[facility]  [address] [place] [address]  [phone]      1/14/2019      Demetria France  [address]  [facility] [id number]      Dear  Demetria/Aurea,      I have been attempting to reach you since our last contact. I would like to continue to work with you on the goals from our last conversation and provide the support you may need. I would appreciate if you would give me a call at [phone] and let me know if you would like to continue working together. I know that there are many things that can affect our ability to communicate and hope we can plan better moving forward.     All of us at [facility] are invested in your health and are here to assist you in meeting your goals.     Sincerely,        Bernadette Gomez, [facility]  Clinic Care Coordination  [phone]

## 2019-01-13 DIAGNOSIS — F05 SUNDOWN SYNDROME: ICD-10-CM

## 2019-01-14 ENCOUNTER — TELEPHONE (OUTPATIENT)
Dept: FAMILY MEDICINE | Facility: CLINIC | Age: 74
End: 2019-01-14

## 2019-01-14 DIAGNOSIS — G44.219 EPISODIC TENSION-TYPE HEADACHE, NOT INTRACTABLE: ICD-10-CM

## 2019-01-14 RX ORDER — MIRTAZAPINE 7.5 MG/1
TABLET, FILM COATED ORAL
Qty: 30 TABLET | Refills: 1 | Status: SHIPPED | OUTPATIENT
Start: 2019-01-14

## 2019-01-14 RX ORDER — PROPRANOLOL HYDROCHLORIDE 40 MG/1
40 TABLET ORAL 2 TIMES DAILY
Qty: 60 TABLET | Refills: 3 | OUTPATIENT
Start: 2019-01-14

## 2019-01-14 NOTE — TELEPHONE ENCOUNTER
Routing refill request to provider for review/approval because:  Labs not current:  ALT, AST, Cr  Pt 72 y/o    Destini MCCRAY RN

## 2019-01-14 NOTE — TELEPHONE ENCOUNTER
"Requested Prescriptions   Pending Prescriptions Disp Refills     mirtazapine (REMERON) 7.5 MG tablet [Pharmacy Med Name: MIRTAZAPINE 7.5MG   TAB] 30 tablet 1     Sig: TAKE 1 TABLET BY MOUTH ONCE DAILY AT BEDTIME    Atypical Antidepressants Protocol Passed - 1/13/2019 11:53 AM       Passed - Recent (12 mo) or future (30 days) visit within the authorizing provider's specialty    Patient had office visit in the last 12 months or has a visit in the next 30 days with authorizing provider or within the authorizing provider's specialty.  See \"Patient Info\" tab in inbasket, or \"Choose Columns\" in Meds & Orders section of the refill encounter.             Passed - Medication active on med list       Passed - Patient is age 18 or older       Passed - No active pregnancy on record       Passed - No positive pregnancy test in past 12 mos        mirtazapine (REMERON) 7.5 MG tablet  Last Written Prescription Date:  12/18/2018  Last Fill Quantity: 30 tablet,  # refills: 1   Last office visit: 10/29/2018 with prescribing provider:  PRISCILLA Duarte   Future Office Visit:      Jasmyn KNAPP (R) (M)    "

## 2019-01-14 NOTE — TELEPHONE ENCOUNTER
Pt should not be out of this medication.  See 1/7/19 Telephone Encounter.    Destini MCCRAY RN

## 2019-01-14 NOTE — TELEPHONE ENCOUNTER
"Requested Prescriptions   Pending Prescriptions Disp Refills     naproxen (NAPROSYN) 375 MG tablet [Pharmacy Med Name: NAPROXEN 375MG      TAB] 60 tablet 1     Sig: TAKE 1 TABLET BY MOUTH TWICE DAILY WITH MEALS    NSAID Medications Failed - 1/14/2019  9:25 AM       Failed - Normal ALT on file in past 12 months    No lab results found.         Failed - Normal AST on file in past 12 months    No lab results found.         Failed - Patient is age 6-64 years       Failed - Normal serum creatinine on file in past 12 months    No lab results found.         Passed - Blood pressure under 140/90 in past 12 months    BP Readings from Last 3 Encounters:   11/28/18 117/73   10/29/18 (!) 138/92   10/02/18 138/70                Passed - Recent (12 mo) or future (30 days) visit within the authorizing provider's specialty    Patient had office visit in the last 12 months or has a visit in the next 30 days with authorizing provider or within the authorizing provider's specialty.  See \"Patient Info\" tab in inbasket, or \"Choose Columns\" in Meds & Orders section of the refill encounter.             Passed - Normal CBC on file in past 12 months    Recent Labs   Lab Test 10/29/18  1352   WBC 7.2   RBC 4.45   HGB 13.8   HCT 43.1   *                Passed - Medication is active on med list       Passed - No active pregnancy on record       Passed - No positive pregnancy test in past 12 months      naproxen (NAPROSYN) 375 MG tablet  Last Written Prescription Date:  01/07/2018  Last Fill Quantity: 60 tablet,  # refills: 0   Last office visit: 10/29/2018 with prescribing provider:  PRISCILLA Duarte   Future Office Visit:      Jasmyn Mueller RT (R) (M)      "

## 2019-01-14 NOTE — PROGRESS NOTES
Clinic Care Coordination Contact  [facility]/Voicemail    Referral Source: PCP  Clinical Data: Care Coordinator Outreach  Outreach attempted x 2.  Left message on voicemail with call back information and requested return call.  Plan: Care Coordinator will send unable to contact letter. Care Coordinator will try to reach patient again in 10-15 business days.  JACK Mendez, Clinic Care Coordinator 1/14/2019   2:40 PM  [phone]

## 2019-01-14 NOTE — TELEPHONE ENCOUNTER
"Requested Prescriptions   Pending Prescriptions Disp Refills     propranolol (INDERAL) 40 MG tablet 60 tablet 3     Sig: Take 1 tablet (40 mg) by mouth 2 times daily    Beta-Blockers Protocol Passed - 1/14/2019  2:38 PM       Passed - Blood pressure under 140/90 in past 12 months    BP Readings from Last 3 Encounters:   11/28/18 117/73   10/29/18 (!) 138/92   10/02/18 138/70                Passed - Patient is age 6 or older       Passed - Recent (12 mo) or future (30 days) visit within the authorizing provider's specialty    Patient had office visit in the last 12 months or has a visit in the next 30 days with authorizing provider or within the authorizing provider's specialty.  See \"Patient Info\" tab in inbasket, or \"Choose Columns\" in Meds & Orders section of the refill encounter.             Passed - Medication is active on med list      Last Written Prescription Date:  10/29/18  Last Fill Quantity: 60,  # refills: 3   Last office visit: 10/29/2018 with prescribing provider:  Gerald  Future Office Visit:    "

## 2019-01-15 NOTE — PROGRESS NOTES
Clinic Care Coordination Contact  Outreach    Daughter called Sw back.  She said pt will be going to the assisted living at [facility] in [place].  Pt will transition to [facility] for care.     Daughter had no further needs and said she was going to focus on he own self once pt is moved in tomorrow.     Plan:  Pt to move into the assisted living.  No further calls from  at this time.    JACK Mendez, Clinic Care Coordinator 1/15/2019   2:33 PM  [phone]

## 2019-01-16 ENCOUNTER — MEDICAL CORRESPONDENCE (OUTPATIENT)
Dept: HEALTH INFORMATION MANAGEMENT | Facility: CLINIC | Age: 74
End: 2019-01-16

## 2019-01-16 ENCOUNTER — TELEPHONE (OUTPATIENT)
Dept: FAMILY MEDICINE | Facility: CLINIC | Age: 74
End: 2019-01-16

## 2019-01-17 NOTE — TELEPHONE ENCOUNTER
Form signed faxed and sent to scanning.  Lesley [facility], [facility]  [facility] [place]

## 2019-01-18 RX ORDER — PROPRANOLOL HYDROCHLORIDE 40 MG/1
40 TABLET ORAL 2 TIMES DAILY
Qty: 60 TABLET | Refills: 3 | Status: SHIPPED | OUTPATIENT
Start: 2019-01-18

## 2019-01-22 ENCOUNTER — TELEPHONE (OUTPATIENT)
Dept: FAMILY MEDICINE | Facility: CLINIC | Age: 74
End: 2019-01-22

## 2019-01-22 NOTE — TELEPHONE ENCOUNTER
Reason for Call: Request for an order or referral:    Order or referral being requested: Order to give patient ibuprofen as needed for headaches    Date needed: as soon as possible    Has the patient been seen by the PCP for this problem? YES    Additional comments: Pt has moved to assisted living at [facility] and they need an order to be able to give patient ibuprofen. Pt has alzheimers.  Fax order to [phone]    Phone number Patient can be reached at:  Home number on file Carlie Villar [phone]    Best Time:  any    Can we leave a detailed message on this number?      Call taken on 1/22/2019 at 8:16 AM by Teresa Horowitz

## 2021-09-14 ENCOUNTER — LAB REQUISITION (OUTPATIENT)
Dept: LAB | Facility: CLINIC | Age: 76
End: 2021-09-14
Payer: MEDICARE

## 2021-09-14 DIAGNOSIS — I10 ESSENTIAL (PRIMARY) HYPERTENSION: ICD-10-CM

## 2021-09-14 DIAGNOSIS — E03.9 HYPOTHYROIDISM, UNSPECIFIED: ICD-10-CM

## 2021-09-15 LAB
ANION GAP SERPL CALCULATED.3IONS-SCNC: 1 MMOL/L (ref 3–14)
BUN SERPL-MCNC: 17 MG/DL (ref 7–30)
CALCIUM SERPL-MCNC: 9.1 MG/DL (ref 8.5–10.1)
CHLORIDE BLD-SCNC: 112 MMOL/L (ref 94–109)
CO2 SERPL-SCNC: 31 MMOL/L (ref 20–32)
CREAT SERPL-MCNC: 1.01 MG/DL (ref 0.52–1.04)
GFR SERPL CREATININE-BSD FRML MDRD: 55 ML/MIN/1.73M2
GLUCOSE BLD-MCNC: 77 MG/DL (ref 70–99)
POTASSIUM BLD-SCNC: 3.5 MMOL/L (ref 3.4–5.3)
SODIUM SERPL-SCNC: 144 MMOL/L (ref 133–144)
TSH SERPL DL<=0.005 MIU/L-ACNC: 1 MU/L (ref 0.4–4)

## 2021-09-15 PROCEDURE — 84443 ASSAY THYROID STIM HORMONE: CPT | Mod: ORL | Performed by: NURSE PRACTITIONER

## 2021-09-15 PROCEDURE — P9603 ONE-WAY ALLOW PRORATED MILES: HCPCS | Performed by: NURSE PRACTITIONER

## 2021-09-15 PROCEDURE — 80048 BASIC METABOLIC PNL TOTAL CA: CPT | Performed by: NURSE PRACTITIONER

## 2021-09-15 PROCEDURE — 36415 COLL VENOUS BLD VENIPUNCTURE: CPT | Mod: ORL | Performed by: NURSE PRACTITIONER

## 2022-02-04 ENCOUNTER — LAB REQUISITION (OUTPATIENT)
Dept: LAB | Facility: CLINIC | Age: 77
End: 2022-02-04
Payer: MEDICARE

## 2022-02-04 DIAGNOSIS — E03.9 HYPOTHYROIDISM, UNSPECIFIED: ICD-10-CM

## 2022-02-04 DIAGNOSIS — I10 ESSENTIAL (PRIMARY) HYPERTENSION: ICD-10-CM

## 2022-02-04 LAB
ANION GAP SERPL CALCULATED.3IONS-SCNC: 4 MMOL/L (ref 3–14)
BUN SERPL-MCNC: 18 MG/DL (ref 7–30)
CALCIUM SERPL-MCNC: 9.6 MG/DL (ref 8.5–10.1)
CHLORIDE BLD-SCNC: 114 MMOL/L (ref 94–109)
CO2 SERPL-SCNC: 28 MMOL/L (ref 20–32)
CREAT SERPL-MCNC: 0.93 MG/DL (ref 0.52–1.04)
GFR SERPL CREATININE-BSD FRML MDRD: 64 ML/MIN/1.73M2
GLUCOSE BLD-MCNC: 85 MG/DL (ref 70–99)
POTASSIUM BLD-SCNC: 3.5 MMOL/L (ref 3.4–5.3)
SODIUM SERPL-SCNC: 146 MMOL/L (ref 133–144)
TSH SERPL DL<=0.005 MIU/L-ACNC: 1 MU/L (ref 0.4–4)

## 2022-02-04 PROCEDURE — 84443 ASSAY THYROID STIM HORMONE: CPT | Mod: ORL | Performed by: NURSE PRACTITIONER

## 2022-02-04 PROCEDURE — P9603 ONE-WAY ALLOW PRORATED MILES: HCPCS | Mod: ORL | Performed by: NURSE PRACTITIONER

## 2022-02-04 PROCEDURE — 36415 COLL VENOUS BLD VENIPUNCTURE: CPT | Mod: ORL | Performed by: NURSE PRACTITIONER

## 2022-02-04 PROCEDURE — 80048 BASIC METABOLIC PNL TOTAL CA: CPT | Mod: ORL | Performed by: NURSE PRACTITIONER

## 2022-12-20 ENCOUNTER — LAB REQUISITION (OUTPATIENT)
Dept: LAB | Facility: CLINIC | Age: 77
End: 2022-12-20
Payer: MEDICARE

## 2022-12-20 DIAGNOSIS — E03.9 HYPOTHYROIDISM, UNSPECIFIED: ICD-10-CM

## 2022-12-20 DIAGNOSIS — I10 ESSENTIAL (PRIMARY) HYPERTENSION: ICD-10-CM

## 2022-12-21 LAB
ANION GAP SERPL CALCULATED.3IONS-SCNC: 3 MMOL/L (ref 3–14)
BUN SERPL-MCNC: 20 MG/DL (ref 7–30)
CALCIUM SERPL-MCNC: 9 MG/DL (ref 8.5–10.1)
CHLORIDE BLD-SCNC: 113 MMOL/L (ref 94–109)
CO2 SERPL-SCNC: 30 MMOL/L (ref 20–32)
CREAT SERPL-MCNC: 0.95 MG/DL (ref 0.52–1.04)
GFR SERPL CREATININE-BSD FRML MDRD: 62 ML/MIN/1.73M2
GLUCOSE BLD-MCNC: 82 MG/DL (ref 70–99)
POTASSIUM BLD-SCNC: 3.6 MMOL/L (ref 3.4–5.3)
SODIUM SERPL-SCNC: 146 MMOL/L (ref 133–144)
TSH SERPL DL<=0.005 MIU/L-ACNC: 1.12 MU/L (ref 0.4–4)

## 2022-12-21 PROCEDURE — 36415 COLL VENOUS BLD VENIPUNCTURE: CPT | Mod: ORL | Performed by: NURSE PRACTITIONER

## 2022-12-21 PROCEDURE — 84443 ASSAY THYROID STIM HORMONE: CPT | Mod: ORL | Performed by: NURSE PRACTITIONER

## 2022-12-21 PROCEDURE — P9603 ONE-WAY ALLOW PRORATED MILES: HCPCS | Mod: ORL | Performed by: NURSE PRACTITIONER

## 2022-12-21 PROCEDURE — 80048 BASIC METABOLIC PNL TOTAL CA: CPT | Mod: ORL | Performed by: NURSE PRACTITIONER

## 2023-01-31 ENCOUNTER — LAB REQUISITION (OUTPATIENT)
Dept: LAB | Facility: CLINIC | Age: 78
End: 2023-01-31
Payer: MEDICARE

## 2023-01-31 DIAGNOSIS — E03.9 HYPOTHYROIDISM, UNSPECIFIED: ICD-10-CM

## 2023-01-31 DIAGNOSIS — I10 ESSENTIAL (PRIMARY) HYPERTENSION: ICD-10-CM

## 2023-02-03 LAB
ANION GAP SERPL CALCULATED.3IONS-SCNC: 8 MMOL/L (ref 7–15)
BUN SERPL-MCNC: 16.4 MG/DL (ref 8–23)
CALCIUM SERPL-MCNC: 9.8 MG/DL (ref 8.8–10.2)
CHLORIDE SERPL-SCNC: 107 MMOL/L (ref 98–107)
CREAT SERPL-MCNC: 0.88 MG/DL (ref 0.51–0.95)
DEPRECATED HCO3 PLAS-SCNC: 28 MMOL/L (ref 22–29)
GFR SERPL CREATININE-BSD FRML MDRD: 68 ML/MIN/1.73M2
GLUCOSE SERPL-MCNC: 90 MG/DL (ref 70–99)
POTASSIUM SERPL-SCNC: 3.4 MMOL/L (ref 3.4–5.3)
SODIUM SERPL-SCNC: 143 MMOL/L (ref 136–145)
TSH SERPL DL<=0.005 MIU/L-ACNC: 0.69 UIU/ML (ref 0.3–4.2)

## 2023-02-03 PROCEDURE — 80048 BASIC METABOLIC PNL TOTAL CA: CPT | Mod: ORL | Performed by: NURSE PRACTITIONER

## 2023-02-03 PROCEDURE — 84443 ASSAY THYROID STIM HORMONE: CPT | Mod: ORL | Performed by: NURSE PRACTITIONER

## 2023-02-03 PROCEDURE — 36415 COLL VENOUS BLD VENIPUNCTURE: CPT | Mod: ORL | Performed by: NURSE PRACTITIONER

## 2023-02-03 PROCEDURE — P9603 ONE-WAY ALLOW PRORATED MILES: HCPCS | Mod: ORL | Performed by: NURSE PRACTITIONER

## 2023-03-21 ENCOUNTER — LAB REQUISITION (OUTPATIENT)
Dept: LAB | Facility: CLINIC | Age: 78
End: 2023-03-21
Payer: MEDICARE

## 2023-03-21 DIAGNOSIS — I10 ESSENTIAL (PRIMARY) HYPERTENSION: ICD-10-CM

## 2023-03-21 DIAGNOSIS — E03.9 HYPOTHYROIDISM, UNSPECIFIED: ICD-10-CM

## 2023-03-22 LAB
ANION GAP SERPL CALCULATED.3IONS-SCNC: 7 MMOL/L (ref 7–15)
BUN SERPL-MCNC: 20.4 MG/DL (ref 8–23)
CALCIUM SERPL-MCNC: 9.7 MG/DL (ref 8.8–10.2)
CHLORIDE SERPL-SCNC: 108 MMOL/L (ref 98–107)
CREAT SERPL-MCNC: 0.94 MG/DL (ref 0.51–0.95)
DEPRECATED HCO3 PLAS-SCNC: 28 MMOL/L (ref 22–29)
GFR SERPL CREATININE-BSD FRML MDRD: 63 ML/MIN/1.73M2
GLUCOSE SERPL-MCNC: 89 MG/DL (ref 70–99)
POTASSIUM SERPL-SCNC: 3.9 MMOL/L (ref 3.4–5.3)
SODIUM SERPL-SCNC: 143 MMOL/L (ref 136–145)
TSH SERPL DL<=0.005 MIU/L-ACNC: 3.3 UIU/ML (ref 0.3–4.2)

## 2023-03-22 PROCEDURE — 36415 COLL VENOUS BLD VENIPUNCTURE: CPT | Mod: ORL | Performed by: NURSE PRACTITIONER

## 2023-03-22 PROCEDURE — 84443 ASSAY THYROID STIM HORMONE: CPT | Mod: ORL | Performed by: NURSE PRACTITIONER

## 2023-03-22 PROCEDURE — 80048 BASIC METABOLIC PNL TOTAL CA: CPT | Mod: ORL | Performed by: NURSE PRACTITIONER

## 2023-03-22 PROCEDURE — P9603 ONE-WAY ALLOW PRORATED MILES: HCPCS | Mod: ORL | Performed by: NURSE PRACTITIONER

## 2023-07-27 ENCOUNTER — LAB REQUISITION (OUTPATIENT)
Dept: LAB | Facility: CLINIC | Age: 78
End: 2023-07-27
Payer: COMMERCIAL

## 2023-07-27 DIAGNOSIS — I10 ESSENTIAL (PRIMARY) HYPERTENSION: ICD-10-CM

## 2023-07-27 DIAGNOSIS — E03.9 HYPOTHYROIDISM, UNSPECIFIED: ICD-10-CM

## 2023-07-28 LAB
ANION GAP SERPL CALCULATED.3IONS-SCNC: 7 MMOL/L (ref 7–15)
BUN SERPL-MCNC: 18.8 MG/DL (ref 8–23)
CALCIUM SERPL-MCNC: 9.7 MG/DL (ref 8.8–10.2)
CHLORIDE SERPL-SCNC: 108 MMOL/L (ref 98–107)
CREAT SERPL-MCNC: 0.98 MG/DL (ref 0.51–0.95)
DEPRECATED HCO3 PLAS-SCNC: 28 MMOL/L (ref 22–29)
GFR SERPL CREATININE-BSD FRML MDRD: 59 ML/MIN/1.73M2
GLUCOSE SERPL-MCNC: 92 MG/DL (ref 70–99)
POTASSIUM SERPL-SCNC: 3.6 MMOL/L (ref 3.4–5.3)
SODIUM SERPL-SCNC: 143 MMOL/L (ref 136–145)
TSH SERPL DL<=0.005 MIU/L-ACNC: 3.83 UIU/ML (ref 0.3–4.2)

## 2023-07-28 PROCEDURE — 80048 BASIC METABOLIC PNL TOTAL CA: CPT | Mod: ORL | Performed by: NURSE PRACTITIONER

## 2023-07-28 PROCEDURE — 84443 ASSAY THYROID STIM HORMONE: CPT | Mod: ORL | Performed by: NURSE PRACTITIONER

## 2023-07-28 PROCEDURE — 36415 COLL VENOUS BLD VENIPUNCTURE: CPT | Mod: ORL | Performed by: NURSE PRACTITIONER

## 2023-07-28 PROCEDURE — P9603 ONE-WAY ALLOW PRORATED MILES: HCPCS | Mod: ORL | Performed by: NURSE PRACTITIONER

## 2024-03-26 ENCOUNTER — LAB REQUISITION (OUTPATIENT)
Dept: LAB | Facility: CLINIC | Age: 79
End: 2024-03-26
Payer: COMMERCIAL

## 2024-03-26 DIAGNOSIS — E87.6 HYPOKALEMIA: ICD-10-CM

## 2024-03-27 LAB — POTASSIUM SERPL-SCNC: 4.2 MMOL/L (ref 3.4–5.3)

## 2024-03-27 PROCEDURE — 84132 ASSAY OF SERUM POTASSIUM: CPT | Mod: ORL | Performed by: NURSE PRACTITIONER

## 2024-03-27 PROCEDURE — P9603 ONE-WAY ALLOW PRORATED MILES: HCPCS | Mod: ORL | Performed by: NURSE PRACTITIONER

## 2024-03-27 PROCEDURE — 36415 COLL VENOUS BLD VENIPUNCTURE: CPT | Mod: ORL | Performed by: NURSE PRACTITIONER

## 2024-06-11 ENCOUNTER — LAB REQUISITION (OUTPATIENT)
Dept: LAB | Facility: CLINIC | Age: 79
End: 2024-06-11
Payer: COMMERCIAL

## 2024-06-11 DIAGNOSIS — M62.81 MUSCLE WEAKNESS (GENERALIZED): ICD-10-CM

## 2024-06-12 LAB
ANION GAP SERPL CALCULATED.3IONS-SCNC: 13 MMOL/L (ref 7–15)
BUN SERPL-MCNC: 17.2 MG/DL (ref 8–23)
CALCIUM SERPL-MCNC: 10.4 MG/DL (ref 8.8–10.2)
CHLORIDE SERPL-SCNC: 109 MMOL/L (ref 98–107)
CREAT SERPL-MCNC: 0.98 MG/DL (ref 0.51–0.95)
DEPRECATED HCO3 PLAS-SCNC: 25 MMOL/L (ref 22–29)
EGFRCR SERPLBLD CKD-EPI 2021: 59 ML/MIN/1.73M2
ERYTHROCYTE [DISTWIDTH] IN BLOOD BY AUTOMATED COUNT: 13.7 % (ref 10–15)
GLUCOSE SERPL-MCNC: 86 MG/DL (ref 70–99)
HCT VFR BLD AUTO: 40.8 % (ref 35–47)
HGB BLD-MCNC: 12.8 G/DL (ref 11.7–15.7)
MCH RBC QN AUTO: 29.5 PG (ref 26.5–33)
MCHC RBC AUTO-ENTMCNC: 31.4 G/DL (ref 31.5–36.5)
MCV RBC AUTO: 94 FL (ref 78–100)
PLATELET # BLD AUTO: 130 10E3/UL (ref 150–450)
POTASSIUM SERPL-SCNC: 3.8 MMOL/L (ref 3.4–5.3)
RBC # BLD AUTO: 4.34 10E6/UL (ref 3.8–5.2)
SODIUM SERPL-SCNC: 147 MMOL/L (ref 135–145)
WBC # BLD AUTO: 6.9 10E3/UL (ref 4–11)

## 2024-06-12 PROCEDURE — 80048 BASIC METABOLIC PNL TOTAL CA: CPT | Mod: ORL | Performed by: NURSE PRACTITIONER

## 2024-06-12 PROCEDURE — 85027 COMPLETE CBC AUTOMATED: CPT | Mod: ORL | Performed by: NURSE PRACTITIONER

## 2024-06-12 PROCEDURE — P9603 ONE-WAY ALLOW PRORATED MILES: HCPCS | Mod: ORL | Performed by: NURSE PRACTITIONER

## 2024-06-12 PROCEDURE — 36415 COLL VENOUS BLD VENIPUNCTURE: CPT | Mod: ORL | Performed by: NURSE PRACTITIONER

## 2024-06-18 ENCOUNTER — LAB REQUISITION (OUTPATIENT)
Dept: LAB | Facility: CLINIC | Age: 79
End: 2024-06-18
Payer: COMMERCIAL

## 2024-06-18 DIAGNOSIS — I50.9 HEART FAILURE, UNSPECIFIED (H): ICD-10-CM

## 2024-06-19 LAB
ANION GAP SERPL CALCULATED.3IONS-SCNC: 13 MMOL/L (ref 7–15)
BUN SERPL-MCNC: 18.8 MG/DL (ref 8–23)
CALCIUM SERPL-MCNC: 10 MG/DL (ref 8.8–10.2)
CHLORIDE SERPL-SCNC: 108 MMOL/L (ref 98–107)
CREAT SERPL-MCNC: 1.05 MG/DL (ref 0.51–0.95)
DEPRECATED HCO3 PLAS-SCNC: 24 MMOL/L (ref 22–29)
EGFRCR SERPLBLD CKD-EPI 2021: 54 ML/MIN/1.73M2
GLUCOSE SERPL-MCNC: 90 MG/DL (ref 70–99)
POTASSIUM SERPL-SCNC: 3.6 MMOL/L (ref 3.4–5.3)
SODIUM SERPL-SCNC: 145 MMOL/L (ref 135–145)

## 2024-06-19 PROCEDURE — 36415 COLL VENOUS BLD VENIPUNCTURE: CPT | Mod: ORL | Performed by: NURSE PRACTITIONER

## 2024-06-19 PROCEDURE — 80048 BASIC METABOLIC PNL TOTAL CA: CPT | Mod: ORL | Performed by: NURSE PRACTITIONER

## 2024-06-19 PROCEDURE — P9603 ONE-WAY ALLOW PRORATED MILES: HCPCS | Mod: ORL | Performed by: NURSE PRACTITIONER

## 2024-10-06 ENCOUNTER — LAB REQUISITION (OUTPATIENT)
Dept: LAB | Facility: CLINIC | Age: 79
End: 2024-10-06
Payer: MEDICARE

## 2024-10-06 DIAGNOSIS — I50.9 HEART FAILURE, UNSPECIFIED (H): ICD-10-CM

## 2024-10-07 LAB
ANION GAP SERPL CALCULATED.3IONS-SCNC: 13 MMOL/L (ref 7–15)
BUN SERPL-MCNC: 22.5 MG/DL (ref 8–23)
CALCIUM SERPL-MCNC: 9.9 MG/DL (ref 8.8–10.4)
CHLORIDE SERPL-SCNC: 107 MMOL/L (ref 98–107)
CREAT SERPL-MCNC: 1.06 MG/DL (ref 0.51–0.95)
EGFRCR SERPLBLD CKD-EPI 2021: 54 ML/MIN/1.73M2
ERYTHROCYTE [DISTWIDTH] IN BLOOD BY AUTOMATED COUNT: 13.7 % (ref 10–15)
GLUCOSE SERPL-MCNC: 78 MG/DL (ref 70–99)
HCO3 SERPL-SCNC: 23 MMOL/L (ref 22–29)
HCT VFR BLD AUTO: 36.6 % (ref 35–47)
HGB BLD-MCNC: 11.5 G/DL (ref 11.7–15.7)
MCH RBC QN AUTO: 30.1 PG (ref 26.5–33)
MCHC RBC AUTO-ENTMCNC: 31.4 G/DL (ref 31.5–36.5)
MCV RBC AUTO: 96 FL (ref 78–100)
PLATELET # BLD AUTO: 114 10E3/UL (ref 150–450)
POTASSIUM SERPL-SCNC: 3.5 MMOL/L (ref 3.4–5.3)
RBC # BLD AUTO: 3.82 10E6/UL (ref 3.8–5.2)
SODIUM SERPL-SCNC: 143 MMOL/L (ref 135–145)
WBC # BLD AUTO: 6.9 10E3/UL (ref 4–11)

## 2024-10-07 PROCEDURE — P9603 ONE-WAY ALLOW PRORATED MILES: HCPCS | Mod: ORL | Performed by: NURSE PRACTITIONER

## 2024-10-07 PROCEDURE — 36415 COLL VENOUS BLD VENIPUNCTURE: CPT | Mod: ORL | Performed by: NURSE PRACTITIONER

## 2024-10-07 PROCEDURE — 85027 COMPLETE CBC AUTOMATED: CPT | Mod: ORL | Performed by: NURSE PRACTITIONER

## 2024-10-07 PROCEDURE — 80048 BASIC METABOLIC PNL TOTAL CA: CPT | Mod: ORL | Performed by: NURSE PRACTITIONER

## 2024-10-08 ENCOUNTER — LAB REQUISITION (OUTPATIENT)
Dept: LAB | Facility: CLINIC | Age: 79
End: 2024-10-08
Payer: COMMERCIAL

## 2024-10-08 DIAGNOSIS — N39.0 URINARY TRACT INFECTION, SITE NOT SPECIFIED: ICD-10-CM

## 2024-10-08 LAB
ALBUMIN UR-MCNC: NEGATIVE MG/DL
APPEARANCE UR: ABNORMAL
BACTERIA #/AREA URNS HPF: ABNORMAL /HPF
BILIRUB UR QL STRIP: NEGATIVE
CAOX CRY #/AREA URNS HPF: ABNORMAL /HPF
COLOR UR AUTO: YELLOW
GLUCOSE UR STRIP-MCNC: NEGATIVE MG/DL
HGB UR QL STRIP: ABNORMAL
HYALINE CASTS: 4 /LPF
KETONES UR STRIP-MCNC: NEGATIVE MG/DL
LEUKOCYTE ESTERASE UR QL STRIP: ABNORMAL
MUCOUS THREADS #/AREA URNS LPF: PRESENT /LPF
NITRATE UR QL: POSITIVE
PH UR STRIP: 5.5 [PH] (ref 5–7)
RBC URINE: 1 /HPF
SP GR UR STRIP: 1.01 (ref 1–1.03)
SQUAMOUS EPITHELIAL: <1 /HPF
UROBILINOGEN UR STRIP-MCNC: NORMAL MG/DL
WBC CLUMPS #/AREA URNS HPF: PRESENT /HPF
WBC URINE: >182 /HPF

## 2024-10-08 PROCEDURE — 81001 URINALYSIS AUTO W/SCOPE: CPT | Mod: ORL | Performed by: NURSE PRACTITIONER

## 2024-10-08 PROCEDURE — 87186 SC STD MICRODIL/AGAR DIL: CPT | Mod: ORL | Performed by: NURSE PRACTITIONER

## 2024-10-11 LAB
BACTERIA UR CULT: ABNORMAL
BACTERIA UR CULT: ABNORMAL

## 2024-11-22 ENCOUNTER — LAB REQUISITION (OUTPATIENT)
Dept: LAB | Facility: CLINIC | Age: 79
End: 2024-11-22
Payer: COMMERCIAL

## 2024-11-22 DIAGNOSIS — U07.1 COVID-19: ICD-10-CM

## 2024-11-22 DIAGNOSIS — R30.0 DYSURIA: ICD-10-CM

## 2024-11-22 LAB
ALBUMIN UR-MCNC: NEGATIVE MG/DL
APPEARANCE UR: CLEAR
BILIRUB UR QL STRIP: NEGATIVE
COLOR UR AUTO: ABNORMAL
GLUCOSE UR STRIP-MCNC: NEGATIVE MG/DL
HGB UR QL STRIP: NEGATIVE
HYALINE CASTS: 3 /LPF
KETONES UR STRIP-MCNC: NEGATIVE MG/DL
LEUKOCYTE ESTERASE UR QL STRIP: NEGATIVE
MUCOUS THREADS #/AREA URNS LPF: PRESENT /LPF
NITRATE UR QL: NEGATIVE
PH UR STRIP: 6 [PH] (ref 5–7)
RBC URINE: 1 /HPF
SP GR UR STRIP: 1.02 (ref 1–1.03)
UROBILINOGEN UR STRIP-MCNC: NORMAL MG/DL
WBC URINE: 2 /HPF

## 2024-11-22 PROCEDURE — 81001 URINALYSIS AUTO W/SCOPE: CPT | Mod: ORL | Performed by: NURSE PRACTITIONER

## 2024-11-23 LAB
FLUAV RNA SPEC QL NAA+PROBE: NEGATIVE
FLUBV RNA RESP QL NAA+PROBE: NEGATIVE
RSV RNA SPEC NAA+PROBE: NEGATIVE
SARS-COV-2 RNA RESP QL NAA+PROBE: NEGATIVE

## 2025-02-17 ENCOUNTER — LAB REQUISITION (OUTPATIENT)
Dept: LAB | Facility: CLINIC | Age: 80
End: 2025-02-17
Payer: COMMERCIAL

## 2025-02-17 DIAGNOSIS — U07.1 COVID-19: ICD-10-CM

## 2025-02-17 PROCEDURE — 87637 SARSCOV2&INF A&B&RSV AMP PRB: CPT | Mod: ORL | Performed by: NURSE PRACTITIONER

## 2025-02-18 ENCOUNTER — LAB REQUISITION (OUTPATIENT)
Dept: LAB | Facility: CLINIC | Age: 80
End: 2025-02-18
Payer: COMMERCIAL

## 2025-02-18 DIAGNOSIS — U07.1 COVID-19: ICD-10-CM

## 2025-02-18 LAB
FLUAV RNA SPEC QL NAA+PROBE: NEGATIVE
FLUAV RNA SPEC QL NAA+PROBE: NEGATIVE
FLUBV RNA RESP QL NAA+PROBE: NEGATIVE
FLUBV RNA RESP QL NAA+PROBE: NEGATIVE
RSV RNA SPEC NAA+PROBE: POSITIVE
RSV RNA SPEC NAA+PROBE: POSITIVE
SARS-COV-2 RNA RESP QL NAA+PROBE: NEGATIVE
SARS-COV-2 RNA RESP QL NAA+PROBE: NEGATIVE

## 2025-02-18 PROCEDURE — 87637 SARSCOV2&INF A&B&RSV AMP PRB: CPT | Mod: ORL | Performed by: NURSE PRACTITIONER

## 2025-03-01 ENCOUNTER — LAB REQUISITION (OUTPATIENT)
Dept: LAB | Facility: CLINIC | Age: 80
End: 2025-03-01
Payer: COMMERCIAL

## 2025-03-01 DIAGNOSIS — U07.1 COVID-19: ICD-10-CM

## 2025-03-01 PROCEDURE — 87637 SARSCOV2&INF A&B&RSV AMP PRB: CPT | Mod: ORL | Performed by: NURSE PRACTITIONER

## 2025-03-02 LAB
FLUAV RNA SPEC QL NAA+PROBE: NEGATIVE
FLUBV RNA RESP QL NAA+PROBE: NEGATIVE
RSV RNA SPEC NAA+PROBE: POSITIVE
SARS-COV-2 RNA RESP QL NAA+PROBE: NEGATIVE

## 2025-04-11 ENCOUNTER — LAB REQUISITION (OUTPATIENT)
Dept: LAB | Facility: CLINIC | Age: 80
End: 2025-04-11
Payer: COMMERCIAL

## 2025-04-11 DIAGNOSIS — N39.0 URINARY TRACT INFECTION, SITE NOT SPECIFIED: ICD-10-CM

## 2025-04-11 LAB
ALBUMIN UR-MCNC: NEGATIVE MG/DL
APPEARANCE UR: ABNORMAL
BACTERIA #/AREA URNS HPF: ABNORMAL /HPF
BILIRUB UR QL STRIP: NEGATIVE
COLOR UR AUTO: YELLOW
GLUCOSE UR STRIP-MCNC: NEGATIVE MG/DL
HGB UR QL STRIP: NEGATIVE
HYALINE CASTS: 10 /LPF
KETONES UR STRIP-MCNC: NEGATIVE MG/DL
LEUKOCYTE ESTERASE UR QL STRIP: ABNORMAL
NITRATE UR QL: POSITIVE
PH UR STRIP: 6.5 [PH] (ref 5–7)
RBC URINE: 2 /HPF
SP GR UR STRIP: 1.01 (ref 1–1.03)
UROBILINOGEN UR STRIP-MCNC: NORMAL MG/DL
WBC CLUMPS #/AREA URNS HPF: PRESENT /HPF
WBC URINE: >182 /HPF

## 2025-04-11 PROCEDURE — 87186 SC STD MICRODIL/AGAR DIL: CPT | Mod: ORL | Performed by: NURSE PRACTITIONER

## 2025-04-11 PROCEDURE — 81001 URINALYSIS AUTO W/SCOPE: CPT | Mod: ORL | Performed by: NURSE PRACTITIONER

## 2025-04-13 LAB — BACTERIA UR CULT: ABNORMAL

## 2025-06-01 ENCOUNTER — LAB REQUISITION (OUTPATIENT)
Dept: LAB | Facility: CLINIC | Age: 80
End: 2025-06-01
Payer: COMMERCIAL

## 2025-06-01 DIAGNOSIS — I10 ESSENTIAL (PRIMARY) HYPERTENSION: ICD-10-CM

## 2025-06-02 LAB
ANION GAP SERPL CALCULATED.3IONS-SCNC: 10 MMOL/L (ref 7–15)
BUN SERPL-MCNC: 23.3 MG/DL (ref 8–23)
CALCIUM SERPL-MCNC: 9.9 MG/DL (ref 8.8–10.4)
CHLORIDE SERPL-SCNC: 108 MMOL/L (ref 98–107)
CREAT SERPL-MCNC: 1.05 MG/DL (ref 0.51–0.95)
EGFRCR SERPLBLD CKD-EPI 2021: 54 ML/MIN/1.73M2
GLUCOSE SERPL-MCNC: 82 MG/DL (ref 70–99)
HCO3 SERPL-SCNC: 28 MMOL/L (ref 22–29)
POTASSIUM SERPL-SCNC: 3.9 MMOL/L (ref 3.4–5.3)
SODIUM SERPL-SCNC: 146 MMOL/L (ref 135–145)

## 2025-06-02 PROCEDURE — 80048 BASIC METABOLIC PNL TOTAL CA: CPT | Mod: ORL | Performed by: NURSE PRACTITIONER

## 2025-06-02 PROCEDURE — P9603 ONE-WAY ALLOW PRORATED MILES: HCPCS | Mod: ORL | Performed by: NURSE PRACTITIONER

## 2025-06-02 PROCEDURE — 36415 COLL VENOUS BLD VENIPUNCTURE: CPT | Mod: ORL | Performed by: NURSE PRACTITIONER
